# Patient Record
Sex: FEMALE | Race: BLACK OR AFRICAN AMERICAN | NOT HISPANIC OR LATINO | Employment: UNEMPLOYED | URBAN - METROPOLITAN AREA
[De-identification: names, ages, dates, MRNs, and addresses within clinical notes are randomized per-mention and may not be internally consistent; named-entity substitution may affect disease eponyms.]

---

## 2017-03-15 ENCOUNTER — HOSPITAL ENCOUNTER (EMERGENCY)
Facility: HOSPITAL | Age: 22
Discharge: HOME | End: 2017-03-15
Attending: EMERGENCY MEDICINE
Payer: MEDICAID

## 2017-03-15 VITALS
OXYGEN SATURATION: 99 % | TEMPERATURE: 98 F | WEIGHT: 205 LBS | RESPIRATION RATE: 20 BRPM | DIASTOLIC BLOOD PRESSURE: 79 MMHG | BODY MASS INDEX: 35 KG/M2 | HEIGHT: 64 IN | SYSTOLIC BLOOD PRESSURE: 148 MMHG | HEART RATE: 75 BPM

## 2017-03-15 DIAGNOSIS — R05.9 COUGH: Primary | ICD-10-CM

## 2017-03-15 PROCEDURE — 99283 EMERGENCY DEPT VISIT LOW MDM: CPT

## 2017-03-15 RX ORDER — BENZONATATE 100 MG/1
100 CAPSULE ORAL 3 TIMES DAILY PRN
Qty: 20 CAPSULE | Refills: 0 | Status: SHIPPED | OUTPATIENT
Start: 2017-03-15 | End: 2017-03-25

## 2017-03-15 NOTE — ED TRIAGE NOTES
Pt presents to ED with c/o HA and non-productive cough since the weekend. Pt states symptoms are worse during the night. Denies fever, chills or body aches.

## 2017-03-15 NOTE — ED AVS SNAPSHOT
[facility]  [place] [address]               Aziza Raul   3/15/2017  1:10 PM   ED    Description:  Female : 1995   Department:  [facility]           Your Care was Coordinated By:     Provider Role From To    Segun Whitlock MD Attending Provider 03/15/17 1310 --      Reason for Visit     Cough           Diagnoses this Visit        Comments    Cough    -  Primary       ED Disposition     None           To Do List           Follow-up Information     Follow up with Lore Rodriguez NP. Schedule an appointment as soon as possible for a visit in 3 days.    Specialty:  Family Medicine    Contact information:    [address] [place] [address]  [phone]          Follow up with [facility].    Specialty:  Emergency Medicine    Why:  If symptoms worsen    Contact information:    [place] [id number] [phone]       These Medications        Disp Refills Start End    benzonatate (TESSALON) 100 MG capsule 20 capsule 0 3/15/2017 3/25/2017    Take 1 capsule (100 mg total) by mouth 3 (three) times daily as needed for Cough. - Oral      [facility]     [facility] On [facility] Care Line -  Assistance  Registered nurses in the [facility] provide clinical advisement, health education, appointment booking, and other advisory services.  Call for this free service at [phone].             Medications           Message regarding Medications     Verify the changes and/or additions to your medication regime listed below are the same as discussed with your clinician today.  If any of these changes or additions are incorrect, please notify your healthcare provider.        START taking these NEW medications        Refills    benzonatate (TESSALON) 100 MG capsule 0    Sig: Take 1 capsule (100 mg total) by mouth 3 (three) times daily as needed for Cough.    Class: Print     "Route: Oral           Verify that the below list of medications is an accurate representation of the medications you are currently taking.  If none reported, the list may be blank. If incorrect, please contact your healthcare provider. Carry this list with you in case of emergency.           Current Medications     benzonatate (TESSALON) 100 MG capsule Take 1 capsule (100 mg total) by mouth 3 (three) times daily as needed for Cough.    promethazine-codeine 6.25-10 mg/5 ml (PHENERGAN WITH CODEINE) 6.25-10 mg/5 mL syrup Take 5 mLs by mouth every 6 (six) hours as needed for Cough.           Clinical Reference Information           Your Vitals Were     BP Pulse Temp Resp Height Weight    148/79 (BP Location: Right arm) 75 98.1 °F (36.7 °C) (Oral) 20 5' 4" (1.626 m) 93 kg (205 lb)    SpO2 BMI             99% 35.19 kg/m2         Allergies as of 3/15/2017     No Known Allergies      Immunizations Administered on Date of Encounter - 3/15/2017     None      ED Micro, Lab, POCT     Start Ordered       Status Ordering Provider    03/15/17 1221 03/15/17 1220  POCT urine pregnancy  Once      Ordered       ED Imaging Orders     None      Discharge References/Attachments     BRONCHITIS, ACUTE (ENGLISH)    BENZONATATE CAPSULES (ENGLISH)      [facility] Sign-Up     Activating your [facility] account is as easy as 1-2-3!     1) Visit [email or web address], select Sign Up Now, enter this activation code and your date of birth, then select Next.  [email or web address]  Expires: 4/29/2017  1:13 PM      2) Create a username and password to use when you visit [facility] in the future and select a security question in case you lose your password and select Next.    3) Enter your e-mail address and click Sign Up!    Additional Information  If you have questions, please e-mail [email or web address] or call [phone] to talk to our [facility] staff. Remember, [facility] is NOT to be used for urgent needs. For medical emergencies, dial 911.          " [facility] complies with applicable Federal civil rights laws and does not discriminate on the basis of race, color, national origin, age, disability, or sex.        Language Assistance Services     ATTENTION: Language assistance services are available, free of charge. Please call [phone].      ATENCIÓN: Si habla español, tiene a bingham disposición servicios gratuitos de asistencia lingüística. Llame al [phone].     CHÚ Ý: N?u b?n nói Ti?ng Vi?t, có các d?ch v? h? tr? ngôn ng? mi?n phí dành cho b?n. G?i s? [phone].

## 2017-03-15 NOTE — ED PROVIDER NOTES
Encounter Date: 3/15/2017       History     Chief Complaint   Patient presents with    Cough     dry cough x 4 days with headache; deneis nausea or vomiting; subjective fever     Review of patient's allergies indicates:  No Known Allergies  HPI Comments: Patient reports 4 days of cough and headache.  Gets chest pain and tightness when she is coughing.  No history of asthma.  Recently had a baby.  Denies any fevers.  Does have some muscle aches.  No sore throat.  No nausea vomiting.  Denies any sick contacts.  Uncertain if she had a flu vaccine.  Denies other complaints.    History reviewed. No pertinent past medical history.  History reviewed. No pertinent surgical history.  Family History   Problem Relation Age of Onset    Hypertension Maternal Grandfather     No Known Problems Mother     No Known Problems Father      Social History   Substance Use Topics    Smoking status: Never Smoker    Smokeless tobacco: Never Used    Alcohol use 0.0 oz/week      Comment: occasionally     Review of Systems   Constitutional: Positive for fatigue. Negative for activity change, appetite change, chills and fever.   HENT: Negative for congestion, drooling, ear pain, postnasal drip, rhinorrhea, sinus pressure, sneezing, sore throat, trouble swallowing and voice change.    Eyes: Negative for discharge and redness.   Respiratory: Positive for cough and chest tightness. Negative for shortness of breath and wheezing.    Cardiovascular: Negative for chest pain.   Gastrointestinal: Negative for abdominal pain, diarrhea, nausea and rectal pain.   Endocrine: Negative for polydipsia, polyphagia and polyuria.   Genitourinary: Negative for decreased urine volume, dysuria, flank pain, pelvic pain and urgency.   Musculoskeletal: Positive for myalgias. Negative for back pain and neck pain.   Skin: Negative for rash.   Neurological: Negative for dizziness, tremors, syncope, weakness and numbness.   Hematological: Does not bruise/bleed  easily.   Psychiatric/Behavioral: Negative for confusion and dysphoric mood.   All other systems reviewed and are negative.      Physical Exam   Initial Vitals   BP Pulse Resp Temp SpO2   03/15/17 1218 03/15/17 1218 03/15/17 1218 03/15/17 1218 03/15/17 1218   148/79 75 20 98.1 °F (36.7 °C) 99 %     Physical Exam    Nursing note and vitals reviewed.  Constitutional: She appears well-developed and well-nourished. She is not diaphoretic. No distress.   HENT:   Head: Normocephalic and atraumatic.   Right Ear: External ear normal.   Left Ear: External ear normal.   Nose: Nose normal.   Mouth/Throat: Oropharynx is clear and moist.   Eyes: Conjunctivae and EOM are normal. Pupils are equal, round, and reactive to light. Right eye exhibits no discharge. Left eye exhibits no discharge. No scleral icterus.   Neck: Normal range of motion. Neck supple.   Cardiovascular: Normal rate, regular rhythm, normal heart sounds and intact distal pulses.   No murmur heard.  Pulmonary/Chest: Breath sounds normal. No respiratory distress. She has no wheezes. She has no rhonchi. She has no rales. She exhibits no tenderness.   Abdominal: Soft. Bowel sounds are normal. She exhibits no distension. There is no tenderness. There is no rebound and no guarding.   Musculoskeletal: Normal range of motion. She exhibits no edema or tenderness.   Neurological: She is alert and oriented to person, place, and time. She has normal strength.   Skin: Skin is warm and dry.   Psychiatric: She has a normal mood and affect. Her behavior is normal. Judgment and thought content normal.         ED Course   Procedures  Labs Reviewed   POCT URINE PREGNANCY             Medical Decision Making:   ED Management:  Well-appearing patient presents complaining of cough and viral type syndromes.  Afebrile.  Vital signs within normal limits excepting for slight hypertension.  Lungs are clear.  I have considered PE, she has no complaint of shortness of breath, she is not  tachycardic, and her oxygenation is 99%.  Ear nose and throat without concerning findings.  No lymphadenopathy.  No indication for further testing or imaging.  Prescribed Tessalon Perles, counseled to symptomatic treatment, urged follow-up closely primary care.    I did have an extensive talk regarding signs to return for and need for follow up. Patient expressed understanding and will monitor symptoms closely and follow-up as needed.    PHOEBE Whitlock M.D.  03/15/2017  1:21 PM                     ED Course     Clinical Impression:   The encounter diagnosis was Cough.          Segun Whitlock MD  03/15/17 0368

## 2017-08-02 ENCOUNTER — HOSPITAL ENCOUNTER (EMERGENCY)
Facility: HOSPITAL | Age: 22
Discharge: HOME | End: 2017-08-02
Attending: EMERGENCY MEDICINE
Payer: MEDICAID

## 2017-08-02 VITALS
HEIGHT: 64 IN | BODY MASS INDEX: 34.15 KG/M2 | DIASTOLIC BLOOD PRESSURE: 63 MMHG | SYSTOLIC BLOOD PRESSURE: 138 MMHG | HEART RATE: 91 BPM | WEIGHT: 200 LBS | OXYGEN SATURATION: 98 % | TEMPERATURE: 98 F | RESPIRATION RATE: 18 BRPM
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
## 2017-08-02 DIAGNOSIS — S39.012A BACK STRAIN, INITIAL ENCOUNTER: ICD-10-CM

## 2017-08-02 DIAGNOSIS — R51.9 ACUTE NONINTRACTABLE HEADACHE, UNSPECIFIED HEADACHE TYPE: ICD-10-CM

## 2017-08-02 LAB
B-HCG UR QL: NEGATIVE
CTP QC/QA: YES

## 2017-08-02 PROCEDURE — 99283 EMERGENCY DEPT VISIT LOW MDM: CPT | Mod: 25

## 2017-08-02 PROCEDURE — 25000003 PHARM REV CODE 250: Performed by: NURSE PRACTITIONER

## 2017-08-02 PROCEDURE — 81025 URINE PREGNANCY TEST: CPT | Performed by: NURSE PRACTITIONER

## 2017-08-02 RX ORDER — NAPROXEN 500 MG/1
500 TABLET ORAL 2 TIMES DAILY PRN
Qty: 10 TABLET | Refills: 0 | Status: SHIPPED | OUTPATIENT
Start: 2017-08-02 | End: 2017-08-07

## 2017-08-02 RX ORDER — NAPROXEN 500 MG/1
500 TABLET ORAL
Status: COMPLETED | OUTPATIENT
Start: 2017-08-02 | End: 2017-08-02

## 2017-08-02 RX ORDER — METHOCARBAMOL 500 MG/1
1000 TABLET, FILM COATED ORAL 3 TIMES DAILY PRN
Qty: 18 TABLET | Refills: 0 | Status: SHIPPED | OUTPATIENT
Start: 2017-08-02 | End: 2017-12-30

## 2017-08-02 RX ADMIN — NAPROXEN 500 MG: 500 TABLET ORAL at 02:08
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
## 2017-08-02 NOTE — DISCHARGE INSTRUCTIONS
Please return to the Emergency Department for any new or worsening symptoms including: worsening back pain, headache, fever, chest pain, shortness of breath, loss of consciousness, dizziness, weakness, or any other concerns.     Please follow up with your Primary Care Provider within in the week. If you do not have a Primary Care Provider, you may contact the one listed on your discharge paperwork or you may also call the [facility] Appointment Desk at [phone] to schedule an appointment with a Primary Care Provider.     Please take all medication as prescribed. You have been prescribed Robaxin for muscle pain. Please do not take this medication while working, drinking alcohol, swimming, or while driving/operating heavy machinery. This medication may cause drowsiness, impair judgment, and reduce physical capabilities.    You have been prescribed Naproxen for pain. This is an Non-Steroidal Anti-Inflammatory (NSAID) Medication. Please do not take any additional NSAIDs while you are taking this medication including (Advil, Aleve, Motrin, Ibuprofen, Mobic\meloxicam, Naprosyn, etc.). Please stop taking this medication if you experience: weakness, itching, yellow skin or eyes, joint pains, vomiting blood, blood or black stools, unusual weight gain, or swelling in your arms, legs, hands, or feet.

## 2017-12-30 ENCOUNTER — HOSPITAL ENCOUNTER (EMERGENCY)
Facility: HOSPITAL | Age: 22
Discharge: HOME | End: 2017-12-30
Attending: EMERGENCY MEDICINE
Payer: MEDICAID

## 2017-12-30 VITALS
WEIGHT: 190 LBS | HEART RATE: 108 BPM | SYSTOLIC BLOOD PRESSURE: 137 MMHG | BODY MASS INDEX: 31.65 KG/M2 | HEIGHT: 65 IN | DIASTOLIC BLOOD PRESSURE: 86 MMHG | TEMPERATURE: 100 F | OXYGEN SATURATION: 100 % | RESPIRATION RATE: 20 BRPM

## 2017-12-30 DIAGNOSIS — J06.9 VIRAL URI WITH COUGH: ICD-10-CM

## 2017-12-30 DIAGNOSIS — J10.1 INFLUENZA A: Primary | ICD-10-CM

## 2017-12-30 LAB
B-HCG UR QL: NEGATIVE
CTP QC/QA: YES
DEPRECATED S PYO AG THROAT QL EIA: NEGATIVE
FLUAV AG SPEC QL IA: POSITIVE
FLUBV AG SPEC QL IA: NEGATIVE
SPECIMEN SOURCE: ABNORMAL

## 2017-12-30 PROCEDURE — 25000003 PHARM REV CODE 250: Performed by: PHYSICIAN ASSISTANT

## 2017-12-30 PROCEDURE — 99284 EMERGENCY DEPT VISIT MOD MDM: CPT | Mod: 25

## 2017-12-30 PROCEDURE — 87400 INFLUENZA A/B EACH AG IA: CPT

## 2017-12-30 PROCEDURE — 87880 STREP A ASSAY W/OPTIC: CPT

## 2017-12-30 PROCEDURE — 87081 CULTURE SCREEN ONLY: CPT

## 2017-12-30 PROCEDURE — 81025 URINE PREGNANCY TEST: CPT | Performed by: EMERGENCY MEDICINE

## 2017-12-30 RX ORDER — FLUTICASONE PROPIONATE 50 MCG
1 SPRAY, SUSPENSION (ML) NASAL 2 TIMES DAILY PRN
Qty: 15 G | Refills: 0 | Status: SHIPPED | OUTPATIENT
Start: 2017-12-30 | End: 2020-12-14

## 2017-12-30 RX ORDER — ACETAMINOPHEN 500 MG
1000 TABLET ORAL
Status: COMPLETED | OUTPATIENT
Start: 2017-12-30 | End: 2017-12-30

## 2017-12-30 RX ORDER — IBUPROFEN 600 MG/1
600 TABLET ORAL EVERY 6 HOURS PRN
Qty: 20 TABLET | Refills: 0 | Status: SHIPPED | OUTPATIENT
Start: 2017-12-30 | End: 2018-06-21

## 2017-12-30 RX ORDER — CETIRIZINE HYDROCHLORIDE 10 MG/1
10 TABLET ORAL DAILY
Qty: 30 TABLET | Refills: 0 | Status: ON HOLD | OUTPATIENT
Start: 2017-12-30 | End: 2019-07-27 | Stop reason: HOSPADM

## 2017-12-30 RX ORDER — ACETAMINOPHEN 325 MG/1
325 TABLET ORAL EVERY 6 HOURS PRN
COMMUNITY
End: 2018-06-21

## 2017-12-30 RX ORDER — OSELTAMIVIR PHOSPHATE 75 MG/1
75 CAPSULE ORAL 2 TIMES DAILY
Qty: 10 CAPSULE | Refills: 0 | Status: SHIPPED | OUTPATIENT
Start: 2017-12-30 | End: 2018-01-04

## 2017-12-30 RX ORDER — GUAIFENESIN/DEXTROMETHORPHAN 100-10MG/5
10 SYRUP ORAL 4 TIMES DAILY PRN
Qty: 240 ML | Refills: 0 | Status: SHIPPED | OUTPATIENT
Start: 2017-12-30 | End: 2018-01-09

## 2017-12-30 RX ORDER — OSELTAMIVIR PHOSPHATE 75 MG/1
75 CAPSULE ORAL
Status: COMPLETED | OUTPATIENT
Start: 2017-12-30 | End: 2017-12-30

## 2017-12-30 RX ORDER — GUAIFENESIN/DEXTROMETHORPHAN 100-10MG/5
10 SYRUP ORAL ONCE
Status: COMPLETED | OUTPATIENT
Start: 2017-12-30 | End: 2017-12-30

## 2017-12-30 RX ORDER — CETIRIZINE HYDROCHLORIDE 10 MG/1
10 TABLET ORAL
Status: COMPLETED | OUTPATIENT
Start: 2017-12-30 | End: 2017-12-30

## 2017-12-30 RX ADMIN — ACETAMINOPHEN 1000 MG: 500 TABLET ORAL at 10:12

## 2017-12-30 RX ADMIN — CETIRIZINE HYDROCHLORIDE 10 MG: 10 TABLET, FILM COATED ORAL at 10:12

## 2017-12-30 RX ADMIN — OSELTAMIVIR PHOSPHATE 75 MG: 75 CAPSULE ORAL at 12:12

## 2017-12-30 RX ADMIN — GUAIFENESIN AND DEXTROMETHORPHAN 10 ML: 100; 10 SYRUP ORAL at 11:12

## 2017-12-30 NOTE — DISCHARGE INSTRUCTIONS
You have been evaluated in the emergency department and found to have influenza.    We will discharge to with Robitussin for cough, Zyrtec to take daily, Flonase for nasal congestion, and Tamiflu to help with the treatment of influenza.  Continue to take ibuprofen as needed for discomfort and to treat fever.  Drink lots of fluids.    Follow-up with your primary care physician early next week for reevaluation and further management of symptoms.    Return to this ED if fever persist despite treatment, if unable to tolerate by mouth intake, if any other problems occur.

## 2017-12-30 NOTE — ED PROVIDER NOTES
"Encounter Date: 12/30/2017       History     Chief Complaint   Patient presents with    Cough     "my baby got the flu, my fever was 102 yesterday, bad headache, and coughing, cough so much my head started hurting"    Fever     22-year-old female with no significant past medical history on file presents to ED with chief complaint sore throat, rhinorrhea/nasal congestion, cough productive of yellow/brown sputum, and fever, all since yesterday.  She denies hemoptysis. Patient states her 10-month-old child was recently diagnosed with strep and influenza.  She admits to taking Tylenol without relief.  She denies decreased by mouth intake.  She denies shortness of breath or chest pain.  No abdominal complaints.  No nausea or vomiting.  No urinary complaints.  No vaginal complaints.  Symptoms constant.  No alleviating or exacerbating factors.  No radiation of pain.          Review of patient's allergies indicates:  No Known Allergies  History reviewed. No pertinent past medical history.  History reviewed. No pertinent surgical history.  Family History   Problem Relation Age of Onset    Hypertension Maternal Grandfather     No Known Problems Mother     No Known Problems Father      Social History   Substance Use Topics    Smoking status: Never Smoker    Smokeless tobacco: Never Used    Alcohol use 0.0 oz/week      Comment: occasionally     Review of Systems   Constitutional: Positive for fever. Negative for chills.   HENT: Positive for congestion, rhinorrhea and sore throat. Negative for ear discharge, ear pain and trouble swallowing.    Eyes: Negative.    Respiratory: Positive for cough (productive). Negative for chest tightness, shortness of breath and wheezing.    Cardiovascular: Negative for chest pain.   Gastrointestinal: Negative for abdominal pain, nausea and vomiting.   Endocrine: Negative.    Genitourinary: Negative for dysuria.   Musculoskeletal: Negative for back pain, neck pain and neck stiffness. "   Skin: Negative for rash.   Neurological: Negative for weakness and headaches.   Hematological: Does not bruise/bleed easily.   Psychiatric/Behavioral: Negative.    All other systems reviewed and are negative.      Physical Exam     Initial Vitals [12/30/17 0908]   BP Pulse Resp Temp SpO2   137/86 108 20 100 °F (37.8 °C) 100 %      MAP       103         Physical Exam    Nursing note and vitals reviewed.  Constitutional: She appears well-developed and well-nourished. She is not diaphoretic. No distress.   HENT:   Head: Normocephalic and atraumatic.   Bilateral TMs and ear canals wnl. No mastoid ttp. Oropharynx with mild erythema without tonsillar swelling/exudate.  No uvular deviation.  No oropharyngeal edema.  Airway patent without stridor.  Neck supple.  Mild anterior cervical chain lymphadenopathy without significant swelling.  Boggy nasal mucosa.   Eyes: Conjunctivae and EOM are normal. Pupils are equal, round, and reactive to light.   Neck: Normal range of motion. Neck supple. No tracheal deviation present.   Cardiovascular: Normal heart sounds and intact distal pulses.   No murmur heard.  Pulmonary/Chest: Breath sounds normal. No stridor. No respiratory distress. She has no wheezes. She has no rhonchi. She exhibits no tenderness.   Abdominal: Soft. Bowel sounds are normal. She exhibits no distension. There is no tenderness.   Musculoskeletal: Normal range of motion. She exhibits no tenderness.   Lymphadenopathy:     She has no cervical adenopathy.   Neurological: She is alert and oriented to person, place, and time.   Skin: Skin is warm and dry. Capillary refill takes less than 2 seconds.   Psychiatric: She has a normal mood and affect. Her behavior is normal. Judgment and thought content normal.         ED Course   Procedures  Labs Reviewed   INFLUENZA A AND B ANTIGEN - Abnormal; Notable for the following:        Result Value    Influenza A Ag, EIA Positive (*)     All other components within normal limits    THROAT SCREEN, RAPID   CULTURE, STREP A,  THROAT   POCT URINE PREGNANCY             Medical Decision Making:   Initial Assessment:   22-year-old female chief complaint nasal congestion/rhinorrhea, cough, fever, sore throat, all since yesterday.  + Sick contacts.  Differential Diagnosis:   URI, influenza, pharyngitis, tonsillitis, uvulitis, pneumonia, bronchitis, reactive airway disease  ED Management:  Patient overall well-appearing, in no acute distress, temp 100°F, she is tachycardic but normotensive.    Patient presents to ED complaining of nasal congestion/rhinorrhea, reactive cough, fever and sore throat since yesterday.  She does state that her daughter was recently diagnosed with both strep and influenza.  She denies shortness of breath or chest pain.  She denies difficulty with respirations.  No change in appetite or by mouth intake.  She is overall well-appearing and nontoxic.  She is speaking full sentences without issue.  Tympanic membranes and ear canals within normal limits.  Her neck is supple.  Boggy nasal mucosa.  Posterior oropharynx with mild erythema, however no obvious abnormality.  No stridor, airway patent.  Lungs are clear bilaterally.  She is not tachypneic, no rib retractions or nasal flaring, no signs of respiratory distress.  I do think this represents a viral URI, however given patient's recent sick contacts we will flu swab and rapid strep.  Patient is not immunocompromised, she is not on steroid therapy.    Flu swab positive for flu a.  Rapid strep negative.  I will discharge with Tamiflu as patient is within the 48-hour window.  We will also discharge with symptomatic medications.  I've asked patient to continue drinking lots of fluids, ibuprofen or Tylenol as needed for fever.  I do feel she is safe and stable for discharge and management as an outpatient.  She is no respiratory distress. I've asked her to return to this ED if any other problems occur.  Other:   I have discussed  this case with another health care provider.       <> Summary of the Discussion: I have discussed this case with Dr. Soliman.              Attending Attestation:     Physician Attestation Statement for NP/PA:   I discussed this assessment and plan of this patient with the NP/PA, but I did not personally examine the patient. The face to face encounter was performed by the NP/PA.                  ED Course      Clinical Impression:   The primary encounter diagnosis was Influenza A. A diagnosis of Viral URI with cough was also pertinent to this visit.    Disposition:   Disposition: Discharged  Condition: Stable                        Surendra Terrell PA-C  12/30/17 1220       Hai Soliman MD  01/02/18 0870

## 2017-12-30 NOTE — ED TRIAGE NOTES
Cough fever and headache child sick at home brown mucus since yesterday chest hurts from coughing sore throat

## 2018-01-01 LAB — BACTERIA THROAT CULT: NORMAL

## 2018-06-21 ENCOUNTER — HOSPITAL ENCOUNTER (EMERGENCY)
Facility: HOSPITAL | Age: 23
Discharge: HOME | End: 2018-06-21
Attending: EMERGENCY MEDICINE
Payer: MEDICAID

## 2018-06-21 VITALS
OXYGEN SATURATION: 99 % | DIASTOLIC BLOOD PRESSURE: 73 MMHG | HEIGHT: 64 IN | WEIGHT: 185 LBS | TEMPERATURE: 98 F | HEART RATE: 91 BPM | RESPIRATION RATE: 18 BRPM | BODY MASS INDEX: 31.58 KG/M2 | SYSTOLIC BLOOD PRESSURE: 123 MMHG

## 2018-06-21 DIAGNOSIS — R52 PAIN: ICD-10-CM

## 2018-06-21 DIAGNOSIS — R10.84 GENERALIZED ABDOMINAL PAIN: Primary | ICD-10-CM

## 2018-06-21 DIAGNOSIS — R07.9 CHEST PAIN: ICD-10-CM

## 2018-06-21 LAB
B-HCG UR QL: NEGATIVE
CTP QC/QA: YES

## 2018-06-21 PROCEDURE — 93010 ELECTROCARDIOGRAM REPORT: CPT | Mod: ,,, | Performed by: INTERNAL MEDICINE

## 2018-06-21 PROCEDURE — 99284 EMERGENCY DEPT VISIT MOD MDM: CPT | Mod: 25

## 2018-06-21 PROCEDURE — 93005 ELECTROCARDIOGRAM TRACING: CPT

## 2018-06-21 PROCEDURE — 81025 URINE PREGNANCY TEST: CPT | Performed by: PHYSICIAN ASSISTANT

## 2018-06-21 RX ORDER — FAMOTIDINE 20 MG/1
20 TABLET, FILM COATED ORAL
Qty: 60 TABLET | Refills: 0 | Status: SHIPPED | OUTPATIENT
Start: 2018-06-21 | End: 2020-12-14

## 2018-06-21 NOTE — ED TRIAGE NOTES
chest pain for 2 weeks and abdominal pain for a week nauseated yesterday achy crampy pain no cough sharp pain sometimes when takes a deep breath

## 2018-06-21 NOTE — ED PROVIDER NOTES
Encounter Date: 6/21/2018       History     Chief Complaint   Patient presents with    Chest Pain     x 2 weeks    Abdominal Pain     x 1 week    Nausea     HPI  Review of patient's allergies indicates:  No Known Allergies  History reviewed. No pertinent past medical history.  History reviewed. No pertinent surgical history.  Family History   Problem Relation Age of Onset    Hypertension Maternal Grandfather     No Known Problems Mother     No Known Problems Father      Social History   Substance Use Topics    Smoking status: Current Some Day Smoker    Smokeless tobacco: Never Used    Alcohol use 0.0 oz/week      Comment: occasionally     Review of Systems    Physical Exam     Initial Vitals [06/21/18 0838]   BP Pulse Resp Temp SpO2   130/60 85 20 98.3 °F (36.8 °C) 100 %      MAP       --         Physical Exam    ED Course   Procedures  Labs Reviewed   POCT URINE PREGNANCY          Imaging Results          X-Ray Chest PA And Lateral (Final result)  Result time 06/21/18 09:37:59    Final result by Karlo Yepez MD (06/21/18 09:37:59)                 Impression:      No acute chest disease identified.      Electronically signed by: Karlo Yepez MD  Date:    06/21/2018  Time:    09:37             Narrative:    EXAMINATION:  XR CHEST PA AND LATERAL    CLINICAL HISTORY:  Chest pain, unspecified    TECHNIQUE:  PA and lateral views of the chest were performed.    COMPARISON:  06/14/2016.    FINDINGS:  The cardiac silhouette and superior mediastinal structures are unremarkable. Pulmonary vasculature is within normal limits. The lungs are well aerated and free of focal consolidations. There is no evidence for pneumothorax or pleural effusions. Bony structures are grossly intact.                                              Attending Attestation:     Physician Attestation Statement for NP/PA:   I discussed this assessment and plan of this patient with the NP/PA, but I did not personally examine the patient. The  face to face encounter was performed by the NP/PA.                     Clinical Impression:     Abdominal pain, chest pain                           Corey Reid MD  06/21/18 6037

## 2018-06-21 NOTE — ED PROVIDER NOTES
Encounter Date: 6/21/2018       History     Chief Complaint   Patient presents with    Chest Pain     x 2 weeks    Abdominal Pain     x 1 week    Nausea     CC: Chest pain, Abdominal Pain, Nausea    HPI: 23 y.o. Female with PMHx non-cardiac chest pain presents for emergent consideration of chest pain, abdominal pain and nausea.  She reports intermittent left-sided chest pain that she describes as sharp.  She denies any alleviating or eliciting factors.  She also reports generalized abdominal pain that is intermittent cramping with associated nausea. She denies any current symptoms. She reports LMP in March 2018; she states that she was on DepoProvera (last injection received in January) and since that time she has only had 1 period. She denies vaginal bleeding, vaginal discharge or urinary complaints.           Review of patient's allergies indicates:  No Known Allergies  History reviewed. No pertinent past medical history.  History reviewed. No pertinent surgical history.  Family History   Problem Relation Age of Onset    Hypertension Maternal Grandfather     No Known Problems Mother     No Known Problems Father      Social History   Substance Use Topics    Smoking status: Current Some Day Smoker    Smokeless tobacco: Never Used    Alcohol use 0.0 oz/week      Comment: occasionally     Review of Systems   Constitutional: Negative for chills, diaphoresis, fatigue and fever.   HENT: Negative for congestion, ear pain and sore throat.    Eyes: Negative for pain.   Respiratory: Negative for shortness of breath.    Cardiovascular: Positive for chest pain.   Gastrointestinal: Positive for abdominal pain and nausea. Negative for blood in stool, constipation, diarrhea and vomiting.   Genitourinary: Negative for decreased urine volume, dysuria, vaginal bleeding and vaginal discharge.   Musculoskeletal: Negative for back pain and neck pain.   Skin: Negative for rash.   Neurological: Negative for weakness and  headaches.   Hematological: Does not bruise/bleed easily.   Psychiatric/Behavioral: Negative for confusion.       Physical Exam     Initial Vitals [06/21/18 0838]   BP Pulse Resp Temp SpO2   130/60 85 20 98.3 °F (36.8 °C) 100 %      MAP       --         Physical Exam    Nursing note and vitals reviewed.  Constitutional: Vital signs are normal. She appears well-developed and well-nourished. She is not diaphoretic. She is cooperative.  Non-toxic appearance. She does not have a sickly appearance. She does not appear ill. No distress.   HENT:   Head: Normocephalic and atraumatic.   Right Ear: Tympanic membrane, external ear and ear canal normal.   Left Ear: Tympanic membrane, external ear and ear canal normal.   Nose: Nose normal.   Mouth/Throat: Uvula is midline, oropharynx is clear and moist and mucous membranes are normal. No oropharyngeal exudate.   Eyes: Conjunctivae, EOM and lids are normal. Pupils are equal, round, and reactive to light.   Neck: Trachea normal, normal range of motion, full passive range of motion without pain and phonation normal. Neck supple.   Cardiovascular: Normal rate, regular rhythm, normal heart sounds and intact distal pulses. Exam reveals no gallop and no friction rub.    No murmur heard.  Pulmonary/Chest: Effort normal and breath sounds normal. No respiratory distress. She has no decreased breath sounds. She has no wheezes. She has no rhonchi. She has no rales. She exhibits no mass, no tenderness, no bony tenderness, no laceration, no crepitus, no edema, no deformity, no swelling and no retraction.   Abdominal: Soft. Normal appearance and bowel sounds are normal. She exhibits no distension and no mass. There is no tenderness. There is no rigidity, no rebound, no guarding and no CVA tenderness.   Musculoskeletal: Normal range of motion.   Neurological: She is alert and oriented to person, place, and time. She has normal strength.   Skin: Skin is warm and dry. Capillary refill takes less  than 2 seconds. No rash noted.   Psychiatric: She has a normal mood and affect. Her speech is normal and behavior is normal. Judgment and thought content normal. Cognition and memory are normal.         ED Course   Procedures  Labs Reviewed   POCT URINE PREGNANCY     EKG Readings: (Independently Interpreted)   Initial Reading: No STEMI. Rhythm: Normal Sinus Rhythm. Ectopy: No Ectopy. Conduction: Normal. ST Segments: Normal ST Segments. T Waves: Normal.       Imaging Results          X-Ray Chest PA And Lateral (Final result)  Result time 06/21/18 09:37:59    Final result by Karlo Yepez MD (06/21/18 09:37:59)                 Impression:      No acute chest disease identified.      Electronically signed by: Karlo Yepez MD  Date:    06/21/2018  Time:    09:37             Narrative:    EXAMINATION:  XR CHEST PA AND LATERAL    CLINICAL HISTORY:  Chest pain, unspecified    TECHNIQUE:  PA and lateral views of the chest were performed.    COMPARISON:  06/14/2016.    FINDINGS:  The cardiac silhouette and superior mediastinal structures are unremarkable. Pulmonary vasculature is within normal limits. The lungs are well aerated and free of focal consolidations. There is no evidence for pneumothorax or pleural effusions. Bony structures are grossly intact.                                       APC / Resident Notes:   This is an evaluation of a 23 y.o. female with PMHx non-cardiac chest pain that presents to the Emergency Department for chest pain, abdominal pain and nausea.  She reports intermittent left-sided chest pain that she describes as sharp.  She denies any alleviating or eliciting factors.  She also reports generalized abdominal pain that is intermittent cramping with associated nausea. She denies any current symptoms. She reports LMP in March 2018; she states that she was on DepoProvera (last injection received in January) and since that time she has only had 1 period. She denies vaginal bleeding, vaginal  discharge or urinary complaints. Patient reports that she is under additional stress secondary to her child's father and family issues.    Physical Exam shows a non-toxic, afebrile, and well appearing female.  There is no tenderness to palpation of the chest wall.  No sign of injury. No rash. Regular rate and rhythm, no murmurs, gallops or rubs. Lungs clear to auscultation bilaterally, no wheezing, rales or rhonchi.  There is no evidence of respiratory distress. There is no tenderness to palpation of the abdomen.  The abdomen is not distended.  Bowel sounds are appreciated. No peritoneal signs.    Vital Signs Are Reassuring. If available, previous records reviewed.   RESULTS:   UPT negative.  CXR unrevealing for pneumonia, pneumothorax, pleural effusion, rib fracture.  EKG shows normal sinus rhythm, no STEMI or emergent arrhythmia.    My overall impression is chest pain and generalized abdominal pain.  DDx: chest pain, abdominal pain, stress, anxiety, other  I do not suspect emergent process at this time.    ED Course:  The patient declines any medications in the ER, secondary to her being asymptomatic.  I feel this patient is stable for discharge. D/C Meds:  Pepcid.  The diagnosis, treatment plan, instructions for follow-up and reevaluation with PCP, as well as ED return precautions were discussed and understanding was verbalized. All questions or concerns have been addressed. Patient was discharged home with an instructional sheet which gave not only information regarding the most likely diagnoses but also information regarding when to return to the emergency department for alarming symptoms and when to seek further care.      This case was discussed with Dr. Reid who is in agreement with my assessment and plan.     Meryl Gaming PA-C                   Clinical Impression:   The primary encounter diagnosis was Generalized abdominal pain. Diagnoses of Pain and Chest pain were also pertinent to this  visit.      Disposition:   Disposition: Discharged  Condition: Stable                        Meryl Holbrook PA-C  06/21/18 1113       Meryl Holbrook PA-C  06/21/18 120

## 2018-06-21 NOTE — DISCHARGE INSTRUCTIONS
Your chest x-ray and EKG were normal today.    Please take Pepcid twice daily before meals.    Please try to lower your stress.    Please make a follow-up appointment with primary care.    Return to the emergency room for any concerns.

## 2018-11-19 ENCOUNTER — HOSPITAL ENCOUNTER (EMERGENCY)
Facility: HOSPITAL | Age: 23
Discharge: HOME | End: 2018-11-19
Attending: EMERGENCY MEDICINE
Payer: MEDICAID

## 2018-11-19 VITALS
BODY MASS INDEX: 30.82 KG/M2 | WEIGHT: 185 LBS | TEMPERATURE: 99 F | OXYGEN SATURATION: 99 % | HEIGHT: 65 IN | HEART RATE: 92 BPM | RESPIRATION RATE: 18 BRPM | SYSTOLIC BLOOD PRESSURE: 124 MMHG | DIASTOLIC BLOOD PRESSURE: 62 MMHG

## 2018-11-19 DIAGNOSIS — R07.9 CHEST PAIN: ICD-10-CM

## 2018-11-19 DIAGNOSIS — R05.9 COUGH: ICD-10-CM

## 2018-11-19 DIAGNOSIS — J06.9 VIRAL URI: Primary | ICD-10-CM

## 2018-11-19 LAB
B-HCG UR QL: NEGATIVE
CTP QC/QA: YES

## 2018-11-19 PROCEDURE — 81025 URINE PREGNANCY TEST: CPT | Performed by: PHYSICIAN ASSISTANT

## 2018-11-19 PROCEDURE — 25000003 PHARM REV CODE 250: Performed by: PHYSICIAN ASSISTANT

## 2018-11-19 PROCEDURE — 93005 ELECTROCARDIOGRAM TRACING: CPT

## 2018-11-19 PROCEDURE — 99284 EMERGENCY DEPT VISIT MOD MDM: CPT | Mod: 25

## 2018-11-19 PROCEDURE — 93010 ELECTROCARDIOGRAM REPORT: CPT | Mod: ,,, | Performed by: INTERNAL MEDICINE

## 2018-11-19 RX ORDER — BENZONATATE 100 MG/1
100 CAPSULE ORAL
Status: COMPLETED | OUTPATIENT
Start: 2018-11-19 | End: 2018-11-19

## 2018-11-19 RX ORDER — BENZONATATE 100 MG/1
100 CAPSULE ORAL 3 TIMES DAILY PRN
Qty: 15 CAPSULE | Refills: 0 | Status: ON HOLD | OUTPATIENT
Start: 2018-11-19 | End: 2019-07-27 | Stop reason: HOSPADM

## 2018-11-19 RX ADMIN — BENZONATATE 100 MG: 100 CAPSULE ORAL at 07:11

## 2018-11-19 NOTE — DISCHARGE INSTRUCTIONS
Make sure you are getting rest and drinking lots of fluids.    You have been prescribed Tessalon Perles to take up to 3 times daily for cough.    You can treat your symptoms with DayQuil maximum strength, NyQuil, Cepacol and/or cough drops.  You can also take Emergen-C to help boost your immune system.    Follow-up with primary care.    If for some reason your symptoms worsen or for any new or concerning symptoms, please return to this emergency room.

## 2018-11-19 NOTE — ED TRIAGE NOTES
Patient came in with c/o CP since yesterday. Patient also c/o body aches and sinus pains. Patient states that she was coughing up green mucus but now it is dry cough. Patient is not taking any medication.

## 2018-11-19 NOTE — ED PROVIDER NOTES
"Encounter Date: 11/19/2018    SCRIBE #1 NOTE: I, Jazz Duke, am scribing for, and in the presence of,  Meryl Holbrook PA-C. I have scribed the following portions of the note - Other sections scribed: HPI, ROS.       History     Chief Complaint   Patient presents with    Chest Pain     x2 days, has not taken any medication; reports recent sinus issues; reports she started coughing a lot and "catching chest pains when I was at work"; also reports generalized body aches     CC: Chest Pain    HPI: This is a 23 y.o. F who has no PMHx who presents to the ED for emergent evaluation of acute left sided CP that began yesterday morning. Pt's CP is exacerbated by coughing and sneezing. Pt also reports acute nasal congestion, runny nose, cough, sneezing, sinus pressure, and generalized body aches. She notes frequent sinus infections. No OTC treatment attempted PTA. She has not gotten the flu shot. Pt denies fever, sore throat, abdominal pain, nausea, vomiting, or diarrhea.        The history is provided by the patient. No  was used.     Review of patient's allergies indicates:  No Known Allergies  History reviewed. No pertinent past medical history.  History reviewed. No pertinent surgical history.  Family History   Problem Relation Age of Onset    Hypertension Maternal Grandfather     No Known Problems Mother     No Known Problems Father      Social History     Tobacco Use    Smoking status: Current Some Day Smoker    Smokeless tobacco: Never Used   Substance Use Topics    Alcohol use: Yes     Alcohol/week: 0.0 oz     Comment: occasionally    Drug use: No     Review of Systems   Constitutional: Negative for chills and fever.   HENT: Positive for congestion, rhinorrhea, sinus pressure and sneezing. Negative for ear discharge, ear pain, postnasal drip, sinus pain, sore throat and trouble swallowing.    Eyes: Negative for pain.   Respiratory: Positive for cough. Negative for shortness of breath.  "   Cardiovascular: Positive for chest pain.   Gastrointestinal: Negative for abdominal pain, constipation, diarrhea, nausea and vomiting.   Genitourinary: Negative for decreased urine volume, dysuria, vaginal bleeding, vaginal discharge and vaginal pain.   Musculoskeletal: Positive for myalgias. Negative for back pain.   Skin: Negative for rash.   Neurological: Negative for headaches.   Psychiatric/Behavioral: Negative for confusion.       Physical Exam     Initial Vitals [11/19/18 0653]   BP Pulse Resp Temp SpO2   128/69 96 16 98.5 °F (36.9 °C) 99 %      MAP       --         Physical Exam    Nursing note and vitals reviewed.  Constitutional: Vital signs are normal. She appears well-developed and well-nourished. She is not diaphoretic. She is cooperative.  Non-toxic appearance. She does not have a sickly appearance. She does not appear ill. No distress.   HENT:   Head: Normocephalic and atraumatic.   Right Ear: Tympanic membrane, external ear and ear canal normal.   Left Ear: Tympanic membrane, external ear and ear canal normal.   Nose: Rhinorrhea present. No mucosal edema. Right sinus exhibits no maxillary sinus tenderness and no frontal sinus tenderness. Left sinus exhibits no maxillary sinus tenderness and no frontal sinus tenderness.   Mouth/Throat: Uvula is midline, oropharynx is clear and moist and mucous membranes are normal. No oral lesions. No trismus in the jaw. No uvula swelling. No oropharyngeal exudate, posterior oropharyngeal edema or posterior oropharyngeal erythema.   Eyes: Conjunctivae, EOM and lids are normal. Pupils are equal, round, and reactive to light.   Neck: Trachea normal, normal range of motion, full passive range of motion without pain and phonation normal. Neck supple.   Cardiovascular: Normal rate, regular rhythm, normal heart sounds and intact distal pulses. Exam reveals no gallop and no friction rub.    No murmur heard.  Pulmonary/Chest: Effort normal and breath sounds normal. No  respiratory distress. She has no decreased breath sounds. She has no wheezes. She has no rhonchi. She has no rales.   nonproductive cough witnessed.   Abdominal: Soft. Normal appearance and bowel sounds are normal. She exhibits no distension and no mass. There is no tenderness. There is no rigidity, no rebound, no guarding and no CVA tenderness.   Musculoskeletal: Normal range of motion.   Lymphadenopathy:     She has no cervical adenopathy.   Neurological: She is alert and oriented to person, place, and time. She has normal strength.   Skin: Skin is warm and dry. Capillary refill takes less than 2 seconds. No rash noted.   Psychiatric: She has a normal mood and affect. Her speech is normal and behavior is normal. Judgment and thought content normal. Cognition and memory are normal.         ED Course   Procedures  Labs Reviewed   POCT URINE PREGNANCY     EKG Readings: (Independently Interpreted)   Initial Reading: No STEMI. Rhythm: Normal Sinus Rhythm. Ectopy: No Ectopy. Conduction: Normal. ST Segments: Normal ST Segments. T Waves: Normal. Clinical Impression: Normal Sinus Rhythm       Imaging Results          X-Ray Chest PA And Lateral (Final result)  Result time 11/19/18 07:49:04    Final result by Francisco eL MD (11/19/18 07:49:04)                 Impression:      No acute radiographic findings in the chest.      Electronically signed by: Francisco Le MD  Date:    11/19/2018  Time:    07:49             Narrative:    EXAMINATION:  XR CHEST PA AND LATERAL    CLINICAL HISTORY:  Cough    TECHNIQUE:  PA and lateral views of the chest were performed.    COMPARISON:  Radiograph 06/21/2018.    FINDINGS:  Mediastinal structures are midline.  Hilar contours are normal.  Cardiac silhouette is normal in size.  Lung volumes are symmetric.  No consolidation.  No pneumothorax or pleural effusions.  No free air beneath the diaphragm.  No acute osseous abnormalities.                                       APC / Resident  Notes:   This is an evaluation of a 23 y.o. female that presents to the Emergency Department for chest pain. Patient reports pleuritic chest pain when coughing. She reports that her cough was productive initially, but now it is non-productive. She also reports sinus congestion,  rhinorrhea and body aches. She denies fever, chills, sore throat, otalgia, shortness of breath, N/V/D/C, abdominal pain or further symptoms. She denies attempted tx PTA.     Exam findings: Patient is non-toxic, afebrile and well appearing.  Normocephalic and atraumatic. Nares are patent.  Clear rhinorrhea noted. No nasal mucosal edema. No sinus tenderness to palpation.  The posterior oropharynx is clear without erythema, edema, tonsillar exudates, petechiae. No cervical adenopathy.  TMs clear.  Lungs are clear to auscultation bilaterally, no wheezing, rales or rhonchi.  Nonproductive cough witnessed.  Regular rate and rhythm, no murmurs.  Abdomen is soft and nontender.  Bowel sounds appreciated. No peritoneal signs.  No rashes.  Remainder of exam is unremarkable.    If available, past records have been reviewed.  Vitals are reassuring.  Results:   UPT negative  EKG normal sinus rhythm  Chest x-ray unrevealing for pneumonia, pleural effusion, pneumothorax.    My overall impression:  Pleuritic chest pain, cough, viral URI  DDx:  Pleuritic chest pain, chest pain, pneumonia, pleural effusion, pneumothorax, cough, viral URI, other    ED course:  Tessalon Perle given in the emergency department.  I will prescribe Tessalon Perles and recommend symptomatic treatment for cold.  I will recommend close follow-up with primary care.  Work note given.  Patient is stable for discharge.  ED return precautions given.    The diagnosis and treatment plan have been discussed with the patient. All questions and concerns have been addressed. Patient expressed understanding. An educational information sheet was given to the patient prior to discharge.     This  case has been discussed with Dr. Rdoriguez and he is in agreement of the diagnosis and treatment plan.     Meryl Holbrook PA-C         Scribe Attestation:   Scribe #1: I performed the above scribed service and the documentation accurately describes the services I performed. I attest to the accuracy of the note.    Attending Attestation:           Physician Attestation for Scribe:  Physician Attestation Statement for Scribe #1: I, Meryl Holbrook PA-C, reviewed documentation, as scribed by Jazz Duke in my presence, and it is both accurate and complete.                    Clinical Impression:   The primary encounter diagnosis was Viral URI. Diagnoses of Chest pain and Cough were also pertinent to this visit.      Disposition:   Disposition: Discharged  Condition: Stable                        Meryl Holbrook PA-C  11/19/18 0816

## 2019-02-02 ENCOUNTER — HOSPITAL ENCOUNTER (EMERGENCY)
Facility: HOSPITAL | Age: 24
Discharge: HOME | End: 2019-02-02
Attending: EMERGENCY MEDICINE
Payer: MEDICAID

## 2019-02-02 VITALS
HEIGHT: 65 IN | WEIGHT: 185 LBS | RESPIRATION RATE: 18 BRPM | SYSTOLIC BLOOD PRESSURE: 121 MMHG | DIASTOLIC BLOOD PRESSURE: 60 MMHG | OXYGEN SATURATION: 100 % | BODY MASS INDEX: 30.82 KG/M2 | HEART RATE: 79 BPM | TEMPERATURE: 98 F

## 2019-02-02 DIAGNOSIS — R19.7 NAUSEA VOMITING AND DIARRHEA: ICD-10-CM

## 2019-02-02 DIAGNOSIS — R10.13 EPIGASTRIC PAIN: Primary | ICD-10-CM

## 2019-02-02 DIAGNOSIS — R11.2 NAUSEA VOMITING AND DIARRHEA: ICD-10-CM

## 2019-02-02 LAB
ALBUMIN SERPL BCP-MCNC: 3.8 G/DL
ALP SERPL-CCNC: 65 U/L
ALT SERPL W/O P-5'-P-CCNC: 13 U/L
ANION GAP SERPL CALC-SCNC: 8 MMOL/L
ANISOCYTOSIS BLD QL SMEAR: SLIGHT
AST SERPL-CCNC: 27 U/L
B-HCG UR QL: NEGATIVE
BACTERIA #/AREA URNS HPF: ABNORMAL /HPF
BASOPHILS # BLD AUTO: 0 K/UL
BASOPHILS NFR BLD: 0 %
BILIRUB SERPL-MCNC: 0.2 MG/DL
BILIRUB UR QL STRIP: NEGATIVE
BUN SERPL-MCNC: 9 MG/DL
CALCIUM SERPL-MCNC: 9.6 MG/DL
CHLORIDE SERPL-SCNC: 105 MMOL/L
CLARITY UR: ABNORMAL
CO2 SERPL-SCNC: 23 MMOL/L
COLOR UR: YELLOW
CREAT SERPL-MCNC: 0.8 MG/DL
CTP QC/QA: YES
DIFFERENTIAL METHOD: ABNORMAL
EOSINOPHIL # BLD AUTO: 0.1 K/UL
EOSINOPHIL NFR BLD: 2.4 %
ERYTHROCYTE [DISTWIDTH] IN BLOOD BY AUTOMATED COUNT: 18 %
EST. GFR  (AFRICAN AMERICAN): >60 ML/MIN/1.73 M^2
EST. GFR  (NON AFRICAN AMERICAN): >60 ML/MIN/1.73 M^2
GLUCOSE SERPL-MCNC: 106 MG/DL
GLUCOSE UR QL STRIP: NEGATIVE
HCT VFR BLD AUTO: 29.4 %
HGB BLD-MCNC: 8.4 G/DL
HGB UR QL STRIP: NEGATIVE
HYPOCHROMIA BLD QL SMEAR: ABNORMAL
KETONES UR QL STRIP: NEGATIVE
LEUKOCYTE ESTERASE UR QL STRIP: ABNORMAL
LIPASE SERPL-CCNC: 12 U/L
LYMPHOCYTES # BLD AUTO: 1.2 K/UL
LYMPHOCYTES NFR BLD: 24.7 %
MCH RBC QN AUTO: 20 PG
MCHC RBC AUTO-ENTMCNC: 28.6 G/DL
MCV RBC AUTO: 70 FL
MICROSCOPIC COMMENT: ABNORMAL
MONOCYTES # BLD AUTO: 0.3 K/UL
MONOCYTES NFR BLD: 6.8 %
NEUTROPHILS # BLD AUTO: 3.3 K/UL
NEUTROPHILS NFR BLD: 66.3 %
NITRITE UR QL STRIP: NEGATIVE
PH UR STRIP: 5 [PH] (ref 5–8)
PLATELET # BLD AUTO: 270 K/UL
PLATELET BLD QL SMEAR: ABNORMAL
PMV BLD AUTO: 10.6 FL
POTASSIUM SERPL-SCNC: 4 MMOL/L
PROT SERPL-MCNC: 7.7 G/DL
PROT UR QL STRIP: NEGATIVE
RBC # BLD AUTO: 4.19 M/UL
SODIUM SERPL-SCNC: 136 MMOL/L
SP GR UR STRIP: 1.03 (ref 1–1.03)
SQUAMOUS #/AREA URNS HPF: 15 /HPF
URN SPEC COLLECT METH UR: ABNORMAL
UROBILINOGEN UR STRIP-ACNC: NEGATIVE EU/DL
WBC # BLD AUTO: 4.97 K/UL
WBC #/AREA URNS HPF: 5 /HPF (ref 0–5)

## 2019-02-02 PROCEDURE — 96375 TX/PRO/DX INJ NEW DRUG ADDON: CPT

## 2019-02-02 PROCEDURE — 80053 COMPREHEN METABOLIC PANEL: CPT

## 2019-02-02 PROCEDURE — 83690 ASSAY OF LIPASE: CPT

## 2019-02-02 PROCEDURE — 96374 THER/PROPH/DIAG INJ IV PUSH: CPT

## 2019-02-02 PROCEDURE — 96361 HYDRATE IV INFUSION ADD-ON: CPT

## 2019-02-02 PROCEDURE — 81025 URINE PREGNANCY TEST: CPT | Performed by: PHYSICIAN ASSISTANT

## 2019-02-02 PROCEDURE — 99284 EMERGENCY DEPT VISIT MOD MDM: CPT | Mod: 25

## 2019-02-02 PROCEDURE — 85025 COMPLETE CBC W/AUTO DIFF WBC: CPT

## 2019-02-02 PROCEDURE — 25000003 PHARM REV CODE 250: Performed by: PHYSICIAN ASSISTANT

## 2019-02-02 PROCEDURE — 63600175 PHARM REV CODE 636 W HCPCS: Performed by: PHYSICIAN ASSISTANT

## 2019-02-02 PROCEDURE — 81000 URINALYSIS NONAUTO W/SCOPE: CPT

## 2019-02-02 RX ORDER — PANTOPRAZOLE SODIUM 20 MG/1
20 TABLET, DELAYED RELEASE ORAL DAILY
Qty: 30 TABLET | Refills: 0 | Status: SHIPPED | OUTPATIENT
Start: 2019-02-02 | End: 2019-03-04

## 2019-02-02 RX ORDER — KETOROLAC TROMETHAMINE 30 MG/ML
15 INJECTION, SOLUTION INTRAMUSCULAR; INTRAVENOUS
Status: COMPLETED | OUTPATIENT
Start: 2019-02-02 | End: 2019-02-02

## 2019-02-02 RX ORDER — ONDANSETRON 2 MG/ML
4 INJECTION INTRAMUSCULAR; INTRAVENOUS
Status: COMPLETED | OUTPATIENT
Start: 2019-02-02 | End: 2019-02-02

## 2019-02-02 RX ORDER — DICYCLOMINE HYDROCHLORIDE 20 MG/1
20 TABLET ORAL 4 TIMES DAILY PRN
Qty: 20 TABLET | Refills: 0 | Status: SHIPPED | OUTPATIENT
Start: 2019-02-02 | End: 2019-02-09

## 2019-02-02 RX ORDER — ONDANSETRON 4 MG/1
4 TABLET, ORALLY DISINTEGRATING ORAL EVERY 6 HOURS PRN
Qty: 15 TABLET | Refills: 0 | Status: SHIPPED | OUTPATIENT
Start: 2019-02-02 | End: 2019-02-07

## 2019-02-02 RX ADMIN — LIDOCAINE HYDROCHLORIDE: 20 SOLUTION ORAL; TOPICAL at 07:02

## 2019-02-02 RX ADMIN — KETOROLAC TROMETHAMINE 15 MG: 30 INJECTION, SOLUTION INTRAMUSCULAR at 08:02

## 2019-02-02 RX ADMIN — SODIUM CHLORIDE 1000 ML: 0.9 INJECTION, SOLUTION INTRAVENOUS at 07:02

## 2019-02-02 RX ADMIN — ONDANSETRON 4 MG: 2 INJECTION INTRAMUSCULAR; INTRAVENOUS at 08:02

## 2019-02-02 NOTE — ED TRIAGE NOTES
Reports having possible food poisoning. Reports eating crab cakes at work Thursday. Reports having abdominal pain, reports diarrhea, and vomitting

## 2019-02-02 NOTE — ED PROVIDER NOTES
Encounter Date: 2/2/2019    SCRIBE #1 NOTE: I, Cristiana Pena, am scribing for, and in the presence of,  Nicole Mcclelland PA-C. I have scribed the following portions of the note - Other sections scribed: HPI and ROS.       History     Chief Complaint   Patient presents with    Abdominal Pain     pt states Friday morning about 0100 she ate a crab cake. about 0500 she began to feel sick. later began having abd pain and vomiting . now having constant abd pain and can not hold food or fluids.     CC: Abdominal Pain    HPI: This 23 y.o female presents to the ED for an evaluation of acute onset, intermittent epigastric abdominal pain rated as 10/10 during onset x 2 days.  Reports pain initially began after eating a crab cake, but denies other eating similar foods reporting similar symptoms.  Patient reports at 2300 her pain became constant and reports it radiating to her bilateral thoracic back.  She reports of associated nausea with 4-5 episodes of emesis on yesterday along with 5-6 episodes of diarrhea.  She reports on yesterday, she noticed episodes of lightheadedness.  Patient denies fever, chills, cough, rhinorrhea, hematochezia, hematemesis, rash, dysuria, vaginal bleeding, vaginal discharge, chest pain, shortness of breath, or any other associated symptoms.  No prior tx.  No alleviating factors.  LMP 01/08/2019.  Last alcohol consumption 1 week ago.      The history is provided by the patient. No  was used.     Review of patient's allergies indicates:  No Known Allergies  History reviewed. No pertinent past medical history.  History reviewed. No pertinent surgical history.  Family History   Problem Relation Age of Onset    Hypertension Maternal Grandfather     No Known Problems Mother     No Known Problems Father      Social History     Tobacco Use    Smoking status: Current Some Day Smoker    Smokeless tobacco: Never Used   Substance Use Topics    Alcohol use: Yes     Alcohol/week: 0.0 oz      Comment: occasionally    Drug use: No     Review of Systems   Constitutional: Positive for chills. Negative for fever.   HENT: Negative for congestion, ear pain, rhinorrhea and sore throat.    Eyes: Negative for redness.   Respiratory: Negative for cough.    Gastrointestinal: Positive for abdominal pain, diarrhea, nausea and vomiting.   Genitourinary: Negative for dysuria, frequency, hematuria, urgency and vaginal discharge.   Musculoskeletal: Positive for back pain. Negative for neck pain.   Skin: Negative for rash.   Neurological: Negative for dizziness, speech difficulty and light-headedness.   Psychiatric/Behavioral: Negative for confusion.       Physical Exam     Initial Vitals [02/02/19 0649]   BP Pulse Resp Temp SpO2   (!) 149/66 94 16 98.3 °F (36.8 °C) 99 %      MAP       --         Physical Exam    Nursing note and vitals reviewed.  Constitutional: She appears well-developed and well-nourished.   HENT:   Head: Normocephalic.   Right Ear: External ear normal.   Left Ear: External ear normal.   Nose: Nose normal.   Mouth/Throat: Oropharynx is clear and moist.   Eyes: Conjunctivae are normal.   Cardiovascular: Normal rate and regular rhythm. Exam reveals no gallop and no friction rub.    No murmur heard.  Pulmonary/Chest: Breath sounds normal. She has no wheezes. She has no rhonchi. She has no rales.   Abdominal: Soft. She exhibits no distension. Bowel sounds are increased. There is tenderness in the right upper quadrant, epigastric area and left upper quadrant. There is no rigidity, no rebound, no guarding, no CVA tenderness, no tenderness at McBurney's point and negative Calhoun's sign.   Musculoskeletal: Normal range of motion.   Neurological: She is alert. She has normal strength.   Skin: Skin is warm and dry.   Psychiatric: She has a normal mood and affect.         ED Course   Procedures  Labs Reviewed   CBC W/ AUTO DIFFERENTIAL - Abnormal; Notable for the following components:       Result Value     Hemoglobin 8.4 (*)     Hematocrit 29.4 (*)     MCV 70 (*)     MCH 20.0 (*)     MCHC 28.6 (*)     RDW 18.0 (*)     All other components within normal limits   URINALYSIS, REFLEX TO URINE CULTURE - Abnormal; Notable for the following components:    Appearance, UA Hazy (*)     Leukocytes, UA 1+ (*)     All other components within normal limits    Narrative:     Preferred Collection Type->Urine, Clean Catch   URINALYSIS MICROSCOPIC - Abnormal; Notable for the following components:    Bacteria, UA Moderate (*)     All other components within normal limits    Narrative:     Preferred Collection Type->Urine, Clean Catch   COMPREHENSIVE METABOLIC PANEL   LIPASE   POCT URINE PREGNANCY          Imaging Results          US Abdomen Limited (Final result)  Result time 02/02/19 08:58:42    Final result by Treva Callahan MD (02/02/19 08:58:42)                 Impression:      Coarsened appearance of the liver with increased echogenicity suggesting fatty infiltration with intrinsic hepatic dysfunction.  Correlation with liver enzymes is recommended.    Questionable small amount of ascites adjacent to the hepatic dome.    No evidence for cholelithiasis or cholecystitis.      Electronically signed by: Treva Callahan MD  Date:    02/02/2019  Time:    08:58             Narrative:    EXAMINATION:  US ABDOMEN LIMITED    CLINICAL HISTORY:  Abdominal Pain - Gallbladder;    TECHNIQUE:  Limited ultrasound of the right upper quadrant of the abdomen (including pancreas, liver, gallbladder, common bile duct, and right kidney) was performed.    COMPARISON:  None.    FINDINGS:  Liver: Normal in size, measuring 13.7 cm. Coarsened appearance of the liver with increased echogenicity.  No focal hepatic lesions.    Gallbladder: No calculi, wall thickening, or pericholecystic fluid.  No sonographic Calhoun's sign.    Biliary system: The common duct is not dilated, measuring 2.7 mm.  No intrahepatic ductal dilatation.    Right kidney: Normal in size  with no hydronephrosis, measuring 10.8 cm.    Miscellaneous: Questionable small amount of fluid adjacent to the hepatic dome.                                 Medical Decision Making:   Initial Assessment:   23-year-old female with no pertinent past medical history and no abdominal surgeries presenting for evaluation of 2 day history of initially intermittent epigastric pain 10/10 no aggravating factors that became constant at approximately 2300 yesterday radiating to thoracic back bilaterally. associated vomiting 4-5 times since yesterday and diarrhea 5-6 times. Lightheadedness since yesterday. Denies melena, hematochezia, hematemesis. Last alcohol intake last week, she drinks one medium-large daquiri every month or every other month      Differentials cholecystitis, biliary colic, pancreatitis, choledocholithiasis bowel obstruction, acute abdomen  Upper abdominal tenderness to palpation with pain worst in the epigastrium and right upper quadrant.  No peritoneal signs. IV fluids, UPT negative. CBC with anemia. Vitals stable, doubt brisk bleed.  CMP with no electrolyte abnormality.  Lipase within normal limits. UA contaminated sample but doubt infection. Asymptomatic.   Toradol IV, Zofran IV and GI cocktail given.    Repeat exam 2120, pt reports feeling better. no abdominal pain, no longer feeling nauseous. Doubt acute abdomen.   Ultrasound with findings suggestive fatty infiltration with intrinsic hepatic dysfunction and questionable small amount of ascites to hepatic dome.  No evidence of cholelithiasis or cholecystitis.  LFTs within normal limits    Discussed findings with patient.  Discussed importance of GI follow-up for further evaluation management.  Bentyl, Protonix and Zofran at discharge. Follow up with primary care in 2 days as well.  Return to ER for worsening symptoms or as needed.  Discussed with Dr. Marroquin who agrees with assessment and plan.                   Scribe Attestation:   Scribe #1: I  performed the above scribed service and the documentation accurately describes the services I performed. I attest to the accuracy of the note.    Attending Attestation:           Physician Attestation for Scribe:  Physician Attestation Statement for Scribe #1: I, Nicole Mcclelland PA-C, reviewed documentation, as scribed by Cristiana Pena in my presence, and it is both accurate and complete.                    Clinical Impression:   The primary encounter diagnosis was Epigastric pain. A diagnosis of Nausea vomiting and diarrhea was also pertinent to this visit.                             Nicole Mcclelland PA-C  02/02/19 1011

## 2019-02-02 NOTE — DISCHARGE INSTRUCTIONS
Take Bentyl and Protonix for abdominal pain and Zofran for nausea.     Follow up with GI with a copy of your ultrasound results regarding abnormalities.     Return to ER for worsening symptoms, inability to tolerate by mouth or as needed.

## 2019-03-22 ENCOUNTER — HOSPITAL ENCOUNTER (EMERGENCY)
Facility: HOSPITAL | Age: 24
Discharge: HOME | End: 2019-03-22
Attending: EMERGENCY MEDICINE
Payer: MEDICAID

## 2019-03-22 VITALS
HEIGHT: 62 IN | RESPIRATION RATE: 20 BRPM | BODY MASS INDEX: 34.04 KG/M2 | SYSTOLIC BLOOD PRESSURE: 115 MMHG | WEIGHT: 185 LBS | DIASTOLIC BLOOD PRESSURE: 65 MMHG | OXYGEN SATURATION: 97 % | TEMPERATURE: 100 F | HEART RATE: 90 BPM

## 2019-03-22 DIAGNOSIS — R11.2 NON-INTRACTABLE VOMITING WITH NAUSEA, UNSPECIFIED VOMITING TYPE: Primary | ICD-10-CM

## 2019-03-22 LAB
ALBUMIN SERPL BCP-MCNC: 4.1 G/DL
ALP SERPL-CCNC: 62 U/L
ALT SERPL W/O P-5'-P-CCNC: 15 U/L
ANION GAP SERPL CALC-SCNC: 8 MMOL/L
ANISOCYTOSIS BLD QL SMEAR: SLIGHT
AST SERPL-CCNC: 21 U/L
B-HCG UR QL: NEGATIVE
BACTERIA #/AREA URNS HPF: NORMAL /HPF
BASOPHILS # BLD AUTO: 0.01 K/UL
BASOPHILS NFR BLD: 0.1 %
BILIRUB SERPL-MCNC: 0.2 MG/DL
BILIRUB UR QL STRIP: ABNORMAL
BUN SERPL-MCNC: 11 MG/DL
CALCIUM SERPL-MCNC: 9.9 MG/DL
CHLORIDE SERPL-SCNC: 106 MMOL/L
CLARITY UR: ABNORMAL
CO2 SERPL-SCNC: 22 MMOL/L
COLOR UR: YELLOW
CREAT SERPL-MCNC: 0.8 MG/DL
CTP QC/QA: YES
DIFFERENTIAL METHOD: ABNORMAL
EOSINOPHIL # BLD AUTO: 0.4 K/UL
EOSINOPHIL NFR BLD: 3.1 %
ERYTHROCYTE [DISTWIDTH] IN BLOOD BY AUTOMATED COUNT: 17.8 %
EST. GFR  (AFRICAN AMERICAN): >60 ML/MIN/1.73 M^2
EST. GFR  (NON AFRICAN AMERICAN): >60 ML/MIN/1.73 M^2
GIANT PLATELETS BLD QL SMEAR: PRESENT
GLUCOSE SERPL-MCNC: 93 MG/DL
GLUCOSE UR QL STRIP: NEGATIVE
HCT VFR BLD AUTO: 32.1 %
HGB BLD-MCNC: 9.6 G/DL
HGB UR QL STRIP: NEGATIVE
HYALINE CASTS #/AREA URNS LPF: 0 /LPF
HYPOCHROMIA BLD QL SMEAR: ABNORMAL
KETONES UR QL STRIP: ABNORMAL
LEUKOCYTE ESTERASE UR QL STRIP: ABNORMAL
LIPASE SERPL-CCNC: 13 U/L
LYMPHOCYTES # BLD AUTO: 1.4 K/UL
LYMPHOCYTES NFR BLD: 11.7 %
MCH RBC QN AUTO: 20.6 PG
MCHC RBC AUTO-ENTMCNC: 29.9 G/DL
MCV RBC AUTO: 69 FL
MICROSCOPIC COMMENT: NORMAL
MONOCYTES # BLD AUTO: 0.8 K/UL
MONOCYTES NFR BLD: 6.7 %
NEUTROPHILS # BLD AUTO: 9.2 K/UL
NEUTROPHILS NFR BLD: 78.6 %
NITRITE UR QL STRIP: NEGATIVE
OVALOCYTES BLD QL SMEAR: ABNORMAL
PH UR STRIP: 5 [PH] (ref 5–8)
PLATELET # BLD AUTO: 324 K/UL
PLATELET BLD QL SMEAR: ABNORMAL
PMV BLD AUTO: 10.1 FL
POLYCHROMASIA BLD QL SMEAR: ABNORMAL
POTASSIUM SERPL-SCNC: 3.9 MMOL/L
PROT SERPL-MCNC: 8.1 G/DL
PROT UR QL STRIP: ABNORMAL
RBC # BLD AUTO: 4.67 M/UL
RBC #/AREA URNS HPF: 1 /HPF (ref 0–4)
SODIUM SERPL-SCNC: 136 MMOL/L
SP GR UR STRIP: 1.03 (ref 1–1.03)
SQUAMOUS #/AREA URNS HPF: 4 /HPF
TARGETS BLD QL SMEAR: ABNORMAL
URN SPEC COLLECT METH UR: ABNORMAL
UROBILINOGEN UR STRIP-ACNC: ABNORMAL EU/DL
WBC # BLD AUTO: 11.75 K/UL
WBC #/AREA URNS HPF: 2 /HPF (ref 0–5)

## 2019-03-22 PROCEDURE — 80053 COMPREHEN METABOLIC PANEL: CPT

## 2019-03-22 PROCEDURE — 81000 URINALYSIS NONAUTO W/SCOPE: CPT

## 2019-03-22 PROCEDURE — 81025 URINE PREGNANCY TEST: CPT | Performed by: PHYSICIAN ASSISTANT

## 2019-03-22 PROCEDURE — 96372 THER/PROPH/DIAG INJ SC/IM: CPT | Mod: 59

## 2019-03-22 PROCEDURE — 85025 COMPLETE CBC W/AUTO DIFF WBC: CPT

## 2019-03-22 PROCEDURE — 96374 THER/PROPH/DIAG INJ IV PUSH: CPT

## 2019-03-22 PROCEDURE — 63600175 PHARM REV CODE 636 W HCPCS: Performed by: NURSE PRACTITIONER

## 2019-03-22 PROCEDURE — 83690 ASSAY OF LIPASE: CPT

## 2019-03-22 PROCEDURE — 25000003 PHARM REV CODE 250: Performed by: NURSE PRACTITIONER

## 2019-03-22 PROCEDURE — 96361 HYDRATE IV INFUSION ADD-ON: CPT

## 2019-03-22 PROCEDURE — 99284 EMERGENCY DEPT VISIT MOD MDM: CPT | Mod: 25

## 2019-03-22 RX ORDER — ONDANSETRON 2 MG/ML
4 INJECTION INTRAMUSCULAR; INTRAVENOUS
Status: COMPLETED | OUTPATIENT
Start: 2019-03-22 | End: 2019-03-22

## 2019-03-22 RX ORDER — DICYCLOMINE HYDROCHLORIDE 20 MG/1
20 TABLET ORAL 2 TIMES DAILY
Qty: 20 TABLET | Refills: 0 | Status: SHIPPED | OUTPATIENT
Start: 2019-03-22 | End: 2019-04-21

## 2019-03-22 RX ORDER — ONDANSETRON 4 MG/1
4 TABLET, FILM COATED ORAL EVERY 8 HOURS PRN
Qty: 12 TABLET | Refills: 0 | Status: SHIPPED | OUTPATIENT
Start: 2019-03-22

## 2019-03-22 RX ORDER — DICYCLOMINE HYDROCHLORIDE 10 MG/ML
20 INJECTION INTRAMUSCULAR
Status: COMPLETED | OUTPATIENT
Start: 2019-03-22 | End: 2019-03-22

## 2019-03-22 RX ADMIN — SODIUM CHLORIDE 1000 ML: 0.9 INJECTION, SOLUTION INTRAVENOUS at 08:03

## 2019-03-22 RX ADMIN — ONDANSETRON 4 MG: 2 INJECTION INTRAMUSCULAR; INTRAVENOUS at 08:03

## 2019-03-22 RX ADMIN — DICYCLOMINE HYDROCHLORIDE 20 MG: 20 INJECTION, SOLUTION INTRAMUSCULAR at 08:03

## 2019-03-23 NOTE — ED PROVIDER NOTES
"Encounter Date: 3/22/2019    SCRIBE #1 NOTE: I, Jazz Duke, am scribing for, and in the presence of,  Jackie Bejarano NP. I have scribed the following portions of the note - Other sections scribed: HPI, ROS.       History     Chief Complaint   Patient presents with    Emesis     emesis and abd pain since this morning, reports abd pain started yesterday and reports she is unable to hold down any food; reports she's felt "cold" but reports hx low Fe, does not take Fe pills     CC: GI Problem    HPI: This is a 23 y.o. F who has no PMHx who presents to the ED for emergent evaluation of acute epigastric abdominal pain that began today. Pt reports 3 episodes of vomiting immediately after eating a hotdog. No OTC treatment attempted PTA. Her last BM was today. Pt denies fever, diarrhea, hematemesis, vaginal bleeding, vaginal discharge, dysuria, recent travel, consumption of raw foods, or Hx of acid reflux.      The history is provided by the patient. No  was used.     Review of patient's allergies indicates:  No Known Allergies  History reviewed. No pertinent past medical history.  History reviewed. No pertinent surgical history.  Family History   Problem Relation Age of Onset    Hypertension Maternal Grandfather     No Known Problems Mother     Hypertension Father      Social History     Tobacco Use    Smoking status: Current Some Day Smoker    Smokeless tobacco: Never Used   Substance Use Topics    Alcohol use: Yes     Alcohol/week: 0.0 oz     Comment: occasionally    Drug use: No     Review of Systems   Constitutional: Negative for chills and fever.   HENT: Negative for ear pain and sore throat.    Eyes: Negative for pain.   Respiratory: Negative for cough and shortness of breath.    Cardiovascular: Negative for chest pain.   Gastrointestinal: Positive for abdominal pain, nausea and vomiting. Negative for blood in stool, constipation and diarrhea.        (-) Hematemesis   Genitourinary: " Negative for dysuria, vaginal bleeding and vaginal discharge.   Musculoskeletal: Negative for back pain.   Skin: Negative for rash.   Neurological: Negative for headaches.       Physical Exam     Initial Vitals [03/22/19 1936]   BP Pulse Resp Temp SpO2   (!) 141/103 (!) 115 17 98.9 °F (37.2 °C) 99 %      MAP       --         Physical Exam    Nursing note and vitals reviewed.  Constitutional: She appears well-developed and well-nourished.  Non-toxic appearance. No distress.   HENT:   Head: Normocephalic and atraumatic.   Right Ear: Hearing and external ear normal.   Left Ear: Hearing and external ear normal.   Nose: Nose normal.   Mouth/Throat: Uvula is midline, oropharynx is clear and moist and mucous membranes are normal.   Eyes: Conjunctivae and EOM are normal.   Neck: Full passive range of motion without pain. Neck supple.   Cardiovascular: Normal rate, normal heart sounds and normal pulses. Exam reveals no gallop and no friction rub.    No murmur heard.  Pulses:       Radial pulses are 2+ on the right side, and 2+ on the left side.   Pulmonary/Chest: Effort normal and breath sounds normal. No respiratory distress. She has no decreased breath sounds. She has no wheezes. She has no rhonchi. She has no rales.   Abdominal: Soft. Bowel sounds are normal. She exhibits no distension and no mass. There is no tenderness. There is no rigidity, no rebound, no guarding and no CVA tenderness. No hernia.   Musculoskeletal: Normal range of motion.   Neurological: She is alert and oriented to person, place, and time. She has normal strength. Gait normal.   Skin: Skin is warm, dry and intact. Capillary refill takes less than 2 seconds. No rash noted.   Psychiatric: She has a normal mood and affect. Her speech is normal and behavior is normal. Judgment and thought content normal. Cognition and memory are normal.         ED Course   Procedures  Labs Reviewed   URINALYSIS, REFLEX TO URINE CULTURE - Abnormal; Notable for the  following components:       Result Value    Appearance, UA Hazy (*)     Protein, UA 1+ (*)     Ketones, UA Trace (*)     Bilirubin (UA) 1+ (*)     Urobilinogen, UA 2.0-3.0 (*)     Leukocytes, UA 1+ (*)     All other components within normal limits    Narrative:     Preferred Collection Type->Urine, Clean Catch   URINALYSIS MICROSCOPIC    Narrative:     Preferred Collection Type->Urine, Clean Catch   CBC W/ AUTO DIFFERENTIAL   COMPREHENSIVE METABOLIC PANEL   LIPASE   POCT URINE PREGNANCY          Imaging Results    None          Medical Decision Making:   History:   Old Medical Records: I decided to obtain old medical records.  Clinical Tests:   Lab Tests: Ordered and Reviewed  ED Management:  Patient appears to have viral gastroenteritis.  There may also be a GERD or gastritis component.  The patient does not appear dehydrated, septic, toxic and I doubt this is bacterial in nature given that the patient has no bloody stools, recent antibiotics, foreign travel, or raw foods.  Symptoms improved following intravenous fluid resuscitation, Zofran, and Bentyl administration.  Successful p.o. challenge here in the ED.  I do not think this is appendicitis, cholecystitis, obstruction, or diverticulitis.  The patient appears very well, hydrated, and is stable for discharge. All questions answered.  Return precautions given.            Scribe Attestation:   Scribe #1: I performed the above scribed service and the documentation accurately describes the services I performed. I attest to the accuracy of the note.    Attending Attestation:           Physician Attestation for Scribe:  Physician Attestation Statement for Scribe #1: I, Jackie Bejarano NP, reviewed documentation, as scribed by Jazz Duke in my presence, and it is both accurate and complete.                    Clinical Impression:       ICD-10-CM ICD-9-CM   1. Non-intractable vomiting with nausea, unspecified vomiting type R11.2 787.01                                 Jackie Bejarano, NP  03/22/19 5986

## 2019-03-23 NOTE — DISCHARGE INSTRUCTIONS
Thank you for allowing me to care for you today.  I hope our treatment plan will make you feel better in the next few days.  In order for me to take better care of my future patients and improve our Emergency Department, I would appreciate if you can provide us with feedback.  In the next few days, you may receive a survey in the mail.  If you do, it would mean a great deal to me if you would please take the time to complete it.    Thank you and I hope you feel better.  Jackie Bejarano NP

## 2019-03-23 NOTE — ED TRIAGE NOTES
"Pt c/o upper abdominal pain x2 days, N/V since this AM. Vomiting x3 episodes today. Denies diarrhea, dysuria, fever. Also c/o gas, "I feel cold". Describes abdominal pain as "an achy pain", rated 7-8/10. Denies taking meds PTA  "

## 2019-05-25 ENCOUNTER — HOSPITAL ENCOUNTER (EMERGENCY)
Facility: HOSPITAL | Age: 24
Discharge: HOME | End: 2019-05-25
Attending: EMERGENCY MEDICINE
Payer: MEDICAID

## 2019-05-25 VITALS
TEMPERATURE: 98 F | HEIGHT: 64 IN | OXYGEN SATURATION: 100 % | RESPIRATION RATE: 18 BRPM | HEART RATE: 97 BPM | BODY MASS INDEX: 32.44 KG/M2 | WEIGHT: 190 LBS | DIASTOLIC BLOOD PRESSURE: 63 MMHG | SYSTOLIC BLOOD PRESSURE: 129 MMHG

## 2019-05-25 DIAGNOSIS — D50.9 MICROCYTIC HYPOCHROMIC ANEMIA: ICD-10-CM

## 2019-05-25 DIAGNOSIS — R10.9 ABDOMINAL PAIN AFFECTING PREGNANCY: Primary | ICD-10-CM

## 2019-05-25 DIAGNOSIS — O26.899 ABDOMINAL PAIN AFFECTING PREGNANCY: Primary | ICD-10-CM

## 2019-05-25 DIAGNOSIS — Z3A.01 PREGNANCY WITH 7 COMPLETED WEEKS GESTATION: ICD-10-CM

## 2019-05-25 LAB
ALBUMIN SERPL BCP-MCNC: 3.5 G/DL (ref 3.5–5.2)
ALP SERPL-CCNC: 66 U/L (ref 55–135)
ALT SERPL W/O P-5'-P-CCNC: 38 U/L (ref 10–44)
ANION GAP SERPL CALC-SCNC: 9 MMOL/L (ref 8–16)
ANISOCYTOSIS BLD QL SMEAR: ABNORMAL
AST SERPL-CCNC: 41 U/L (ref 10–40)
B-HCG UR QL: NEGATIVE
B-HCG UR QL: POSITIVE
BACTERIA GENITAL QL WET PREP: ABNORMAL
BASOPHILS # BLD AUTO: 0.01 K/UL (ref 0–0.2)
BASOPHILS NFR BLD: 0.2 % (ref 0–1.9)
BILIRUB SERPL-MCNC: 0.1 MG/DL (ref 0.1–1)
BILIRUB UR QL STRIP: NEGATIVE
BUN SERPL-MCNC: 12 MG/DL (ref 6–20)
CALCIUM SERPL-MCNC: 9.2 MG/DL (ref 8.7–10.5)
CHLORIDE SERPL-SCNC: 105 MMOL/L (ref 95–110)
CLARITY UR: CLEAR
CLUE CELLS VAG QL WET PREP: ABNORMAL
CO2 SERPL-SCNC: 20 MMOL/L (ref 23–29)
COLOR UR: YELLOW
CREAT SERPL-MCNC: 0.7 MG/DL (ref 0.5–1.4)
CTP QC/QA: YES
CTP QC/QA: YES
DIFFERENTIAL METHOD: ABNORMAL
EOSINOPHIL # BLD AUTO: 0.1 K/UL (ref 0–0.5)
EOSINOPHIL NFR BLD: 1.5 % (ref 0–8)
ERYTHROCYTE [DISTWIDTH] IN BLOOD BY AUTOMATED COUNT: 18.3 % (ref 11.5–14.5)
EST. GFR  (AFRICAN AMERICAN): >60 ML/MIN/1.73 M^2
EST. GFR  (NON AFRICAN AMERICAN): >60 ML/MIN/1.73 M^2
FILAMENT FUNGI VAG WET PREP-#/AREA: ABNORMAL
GLUCOSE SERPL-MCNC: 100 MG/DL (ref 70–110)
GLUCOSE UR QL STRIP: NEGATIVE
HCG INTACT+B SERPL-ACNC: NORMAL MIU/ML
HCT VFR BLD AUTO: 27.1 % (ref 37–48.5)
HGB BLD-MCNC: 7.9 G/DL (ref 12–16)
HGB UR QL STRIP: NEGATIVE
HYPOCHROMIA BLD QL SMEAR: ABNORMAL
KETONES UR QL STRIP: NEGATIVE
LEUKOCYTE ESTERASE UR QL STRIP: NEGATIVE
LIPASE SERPL-CCNC: 44 U/L (ref 4–60)
LYMPHOCYTES # BLD AUTO: 2 K/UL (ref 1–4.8)
LYMPHOCYTES NFR BLD: 30.7 % (ref 18–48)
MCH RBC QN AUTO: 19.7 PG (ref 27–31)
MCHC RBC AUTO-ENTMCNC: 29.2 G/DL (ref 32–36)
MCV RBC AUTO: 68 FL (ref 82–98)
MONOCYTES # BLD AUTO: 0.5 K/UL (ref 0.3–1)
MONOCYTES NFR BLD: 8.2 % (ref 4–15)
NEUTROPHILS # BLD AUTO: 3.9 K/UL (ref 1.8–7.7)
NEUTROPHILS NFR BLD: 59.6 % (ref 38–73)
NITRITE UR QL STRIP: NEGATIVE
PH UR STRIP: 6 [PH] (ref 5–8)
PLATELET # BLD AUTO: 215 K/UL (ref 150–350)
PLATELET BLD QL SMEAR: ABNORMAL
PMV BLD AUTO: 10.2 FL (ref 9.2–12.9)
POLYCHROMASIA BLD QL SMEAR: ABNORMAL
POTASSIUM SERPL-SCNC: 3.4 MMOL/L (ref 3.5–5.1)
PROT SERPL-MCNC: 7 G/DL (ref 6–8.4)
PROT UR QL STRIP: NEGATIVE
RBC # BLD AUTO: 4.01 M/UL (ref 4–5.4)
SODIUM SERPL-SCNC: 134 MMOL/L (ref 136–145)
SP GR UR STRIP: 1.02 (ref 1–1.03)
SPECIMEN SOURCE: ABNORMAL
T VAGINALIS GENITAL QL WET PREP: ABNORMAL
URN SPEC COLLECT METH UR: NORMAL
UROBILINOGEN UR STRIP-ACNC: NEGATIVE EU/DL
WBC # BLD AUTO: 6.62 K/UL (ref 3.9–12.7)
WBC #/AREA VAG WET PREP: ABNORMAL
YEAST GENITAL QL WET PREP: ABNORMAL

## 2019-05-25 PROCEDURE — 84702 CHORIONIC GONADOTROPIN TEST: CPT

## 2019-05-25 PROCEDURE — 99284 EMERGENCY DEPT VISIT MOD MDM: CPT

## 2019-05-25 PROCEDURE — 87491 CHLMYD TRACH DNA AMP PROBE: CPT

## 2019-05-25 PROCEDURE — 87086 URINE CULTURE/COLONY COUNT: CPT

## 2019-05-25 PROCEDURE — 83690 ASSAY OF LIPASE: CPT

## 2019-05-25 PROCEDURE — 85025 COMPLETE CBC W/AUTO DIFF WBC: CPT

## 2019-05-25 PROCEDURE — 80053 COMPREHEN METABOLIC PANEL: CPT

## 2019-05-25 PROCEDURE — 81025 URINE PREGNANCY TEST: CPT | Performed by: NURSE PRACTITIONER

## 2019-05-25 PROCEDURE — 81003 URINALYSIS AUTO W/O SCOPE: CPT

## 2019-05-25 PROCEDURE — 87210 SMEAR WET MOUNT SALINE/INK: CPT

## 2019-05-25 NOTE — ED TRIAGE NOTES
24 y.o female presents to the ED for evaluation of abdominal cramping. Patient reports she had her menstrual cycle last month but took a pregnancy test yesterday that was positive. She denies vaginal bleeding. AAOX4, ND.

## 2019-05-25 NOTE — ED PROVIDER NOTES
Encounter Date: 5/25/2019       History     Chief Complaint   Patient presents with    Female  Problem     Pt reports finding out she was pregnant yesterday. Reports abdominal cramping today. Denies bleeding. Last menstrual was the beginning of last month      24-year-old female presenting for evaluation of suprapubic abdominal pain that began today.  She reports recently finding out that she is pregnant.  She is uncertain of the exact date of for LMP but knows that it was an early April and lasted for 5 days.  She denies vaginal bleeding, vaginal discharge, nausea, vomiting, diarrhea, constipation, dysuria, urinary frequency, hematuria, back pain, or any additional symptoms. Denies concern for STDs.  Has not taken any medications or attempted any other treatments for symptoms.        Review of patient's allergies indicates:  No Known Allergies  History reviewed. No pertinent past medical history.  History reviewed. No pertinent surgical history.  Family History   Problem Relation Age of Onset    Hypertension Maternal Grandfather     No Known Problems Mother     Hypertension Father      Social History     Tobacco Use    Smoking status: Current Some Day Smoker    Smokeless tobacco: Never Used   Substance Use Topics    Alcohol use: Yes     Alcohol/week: 0.0 oz     Comment: occasionally    Drug use: Yes     Types: Marijuana     Review of Systems   Constitutional: Negative for chills, fatigue and fever.   HENT: Negative for congestion, ear pain, nosebleeds, postnasal drip, rhinorrhea, sinus pressure and sore throat.    Eyes: Negative for photophobia and pain.   Respiratory: Negative for apnea, cough, choking, chest tightness, shortness of breath, wheezing and stridor.    Cardiovascular: Negative for chest pain, palpitations and leg swelling.   Gastrointestinal: Positive for abdominal pain. Negative for constipation, diarrhea, nausea and vomiting.   Genitourinary: Negative for dysuria, flank pain, hematuria,  pelvic pain, vaginal bleeding and vaginal discharge.   Musculoskeletal: Negative for arthralgias, back pain, gait problem and neck pain.   Skin: Negative for color change, pallor, rash and wound.   Neurological: Negative for dizziness, seizures, syncope, facial asymmetry, light-headedness and headaches.   Hematological: Negative for adenopathy.   Psychiatric/Behavioral: Negative for confusion. The patient is not nervous/anxious.        Physical Exam     Initial Vitals [05/25/19 0407]   BP Pulse Resp Temp SpO2   (!) 129/59 103 16 99.1 °F (37.3 °C) 100 %      MAP       --         Physical Exam    Nursing note and vitals reviewed.  Constitutional: She appears well-developed and well-nourished. She is not diaphoretic. She is active and cooperative.  Non-toxic appearance. She does not have a sickly appearance. She does not appear ill. No distress.   Pleasant, cooperative, well-appearing, talkative, in no distress   HENT:   Head: Normocephalic and atraumatic.   Right Ear: External ear normal.   Left Ear: External ear normal.   Nose: Nose normal.   Eyes: Conjunctivae and EOM are normal. Right eye exhibits no discharge. Left eye exhibits no discharge.   Neck: Normal range of motion. Neck supple. No tracheal deviation present.   Pulmonary/Chest: No stridor. No respiratory distress.   Abdominal: Soft. Normal appearance. She exhibits no distension. There is tenderness in the suprapubic area. There is no rigidity, no rebound, no guarding, no CVA tenderness, no tenderness at McBurney's point and negative Calhoun's sign.   Mild suprapubic tenderness to palpation without rigidity or guarding. No rebound tenderness.   Genitourinary: Vagina normal. Pelvic exam was performed with patient supine. Uterus is not tender. Cervix exhibits no motion tenderness, no discharge and no friability. Right adnexum displays no mass, no tenderness and no fullness. Left adnexum displays no mass, no tenderness and no fullness.   Genitourinary Comments:  No abnormalities to the external genitalia.  No discharge or bleeding in the vaginal vault.  Cervical os is closed.  No cervical friability or discharge. No CMT.  No adnexal or uterine TTP.  Exam chaperoned by Meryl Dumont LPN.   Musculoskeletal: Normal range of motion. She exhibits no tenderness.   Neurological: She is alert and oriented to person, place, and time. She has normal strength. No cranial nerve deficit or sensory deficit.   Skin: Skin is warm and dry.   Psychiatric: She has a normal mood and affect. Her behavior is normal. Judgment and thought content normal.         ED Course   Procedures  Labs Reviewed   CBC W/ AUTO DIFFERENTIAL - Abnormal; Notable for the following components:       Result Value    Hemoglobin 7.9 (*)     Hematocrit 27.1 (*)     Mean Corpuscular Volume 68 (*)     Mean Corpuscular Hemoglobin 19.7 (*)     Mean Corpuscular Hemoglobin Conc 29.2 (*)     RDW 18.3 (*)     All other components within normal limits   COMPREHENSIVE METABOLIC PANEL - Abnormal; Notable for the following components:    Sodium 134 (*)     Potassium 3.4 (*)     CO2 20 (*)     AST 41 (*)     All other components within normal limits   VAGINAL SCREEN - Abnormal; Notable for the following components:    WBC - Vaginal Screen Occasional (*)     Bacteria - Vaginal Screen Rare (*)     All other components within normal limits   CULTURE, URINE    Narrative:     Indicated criteria for high risk culture:->Pregnant   C. TRACHOMATIS/N. GONORRHOEAE BY AMP DNA    Narrative:     Resulting Location->[facility]   URINALYSIS   LIPASE   HCG, QUANTITATIVE, PREGNANCY   POCT URINE PREGNANCY          Imaging Results          US OB Less Than 14 Wks First Gestation (Final result)  Result time 05/25/19 05:46:02   Procedure changed from US OB Less Than 14 Wks with Transvag(xpd     Final result by Jadiel Lu DO (05/25/19 05:46:02)                 Impression:      Single live intrauterine gestation with gestational measurements correspond  to approximately 73 day gestation as detailed above.    Otherwise unremarkable limited pelvic ultrasound as detailed above secondary to transabdominal imaging only with patient refusal for transvaginal imaging.      Electronically signed by: Jadiel Lu DO  Date:    2019  Time:    05:46             Narrative:    EXAMINATION:  US OB LESS THAN 14 WEEKS FIRST GESTATION    CLINICAL HISTORY:  suprapubic pain with positive UPT;    TECHNIQUE:  Transabdominal ultrasound of the pelvis.  Transvaginal imaging was not performed secondary to patient refusal.    COMPARISON:  None    FINDINGS:  The uterus is enlarged measuring 11.1 x 7.4 x 5.6 cm.    There is a single live intrauterine gestation with gestational sac fetal pole and yolk sac identified.  Fetal cardiac activity recorded 153 beats per minute.    Mean sac diameter 3.18 cm which corresponds to estimated gestational age of 7 weeks and 3 days with LUKAS of 2020.    The right ovary is normal in size measuring 3.2 x 2.8 x 2.0 cm.    The liver is normal in size measuring 3.7 x 3.8 x 3.3 cm.  There is a focal anechoic region within the left measuring 3.0 cm most compatible with corpus luteal cyst.    No evidence for adnexal mass or free fluid in the pelvis.  There is vascular flow identified in the ovaries bilaterally.                                 Medical Decision Making:   History:   Old Medical Records: I decided to obtain old medical records.  Differential Diagnosis:   Ectopic pregnancy, threatened , spontaneous , UTI, ovarian cyst, PID, TOA, others  Clinical Tests:   Lab Tests: Ordered and Reviewed  Radiological Study: Ordered and Reviewed  ED Management:  HPI and physical exam as above.    24-year-old female presenting for evaluation of lower abdominal and suprapubic pain. Reports recently discovering that she is pregnant.  Has not yet seen her OBGYN or had an ultrasound performed.  Physical exam remarkable for mild generalized suprapubic  tenderness.  Pelvic exam is unremarkable. No evidence of PID.  No vaginal bleeding.  HCG is 83,024. CBC with evidence of anemia, however patient denies lightheadedness, syncope, fatigue.  Urinalysis without evidence of infection.  Ultrasound shows a 7 week and 3 day IUP with a fetal heartbeat of 153 beats per minute. Rh testing was not performed due to absence of vaginal bleeding.  Patient is tolerating p.o. without difficulty and is comfortable and very well-appearing at this time.  I believe patient is stable for discharge and outpatient follow-up.    Advised patient to follow up with her OBGYN and PCP for re-evaluation and further management.  ED return precautions given. All questions regarding diagnosis and plan were answered to the patient's fullest possible satisfaction. Patient expressed understanding of diagnosis, discharge instructions, and return precautions.                        Clinical Impression:       ICD-10-CM ICD-9-CM   1. Abdominal pain affecting pregnancy O26.899 646.80    R10.9 789.00   2. Pregnancy with 7 completed weeks gestation Z3A.01 V22.2   3. Microcytic hypochromic anemia D50.9 280.9         Disposition:   Disposition: Discharged  Condition: Stable                        David Myrick NP  06/07/19 0722

## 2019-05-25 NOTE — DISCHARGE INSTRUCTIONS
Schedule a follow-up appointment with your OBGYN for further testing and treatment as discussed.  Begin taking prenatal vitamins daily if you are not already doing so.  Return to the emergency department for any new or worsening symptoms or as needed for any additional concerns.    Thank you for coming to our Emergency Department today. It is important to remember that some problems are difficult to diagnose and may not be found during your first visit. Be sure to follow up with your primary care doctor.  If you do not have one, you may contact the one listed on your discharge paperwork or you may also call the [facility] Appointment Desk at [phone] to schedule an appointment with one.     Return to the ER with any questions/concerns, new/concerning symptoms, worsening or failure to improve. Do not drive or make any important decisions for 24 hours if you have received any pain medications, sedatives or mood altering drugs during your ER visit.

## 2019-05-27 LAB
BACTERIA UR CULT: NORMAL
C TRACH DNA SPEC QL NAA+PROBE: NOT DETECTED
N GONORRHOEA DNA SPEC QL NAA+PROBE: NOT DETECTED

## 2019-07-25 ENCOUNTER — HOSPITAL ENCOUNTER (INPATIENT)
Facility: HOSPITAL | Age: 24
LOS: 2 days | Discharge: HOME | DRG: 872 | End: 2019-07-27
Attending: EMERGENCY MEDICINE | Admitting: INTERNAL MEDICINE
Payer: MEDICAID

## 2019-07-25 DIAGNOSIS — K52.9 COLITIS: ICD-10-CM

## 2019-07-25 DIAGNOSIS — A41.9 SEVERE SEPSIS: Primary | ICD-10-CM

## 2019-07-25 DIAGNOSIS — R65.20 SEVERE SEPSIS: Primary | ICD-10-CM

## 2019-07-25 DIAGNOSIS — K52.9 ENTEROCOLITIS: ICD-10-CM

## 2019-07-25 DIAGNOSIS — A41.9 SEPSIS: ICD-10-CM

## 2019-07-25 PROBLEM — R79.89 ELEVATED LACTIC ACID LEVEL: Status: ACTIVE | Noted: 2019-07-25

## 2019-07-25 PROBLEM — E87.6 HYPOKALEMIA: Status: ACTIVE | Noted: 2019-07-25

## 2019-07-25 LAB
ALBUMIN SERPL BCP-MCNC: 3.6 G/DL (ref 3.5–5.2)
ALP SERPL-CCNC: 51 U/L (ref 55–135)
ALT SERPL W/O P-5'-P-CCNC: 12 U/L (ref 10–44)
ANION GAP SERPL CALC-SCNC: 12 MMOL/L (ref 8–16)
ANISOCYTOSIS BLD QL SMEAR: SLIGHT
AST SERPL-CCNC: 20 U/L (ref 10–40)
B-HCG UR QL: NEGATIVE
BACTERIA #/AREA URNS HPF: ABNORMAL /HPF
BASOPHILS # BLD AUTO: 0.01 K/UL (ref 0–0.2)
BASOPHILS NFR BLD: 0.2 % (ref 0–1.9)
BILIRUB SERPL-MCNC: 0.4 MG/DL (ref 0.1–1)
BILIRUB UR QL STRIP: ABNORMAL
BUN SERPL-MCNC: 12 MG/DL (ref 6–20)
CALCIUM SERPL-MCNC: 9.4 MG/DL (ref 8.7–10.5)
CHLORIDE SERPL-SCNC: 102 MMOL/L (ref 95–110)
CLARITY UR: ABNORMAL
CO2 SERPL-SCNC: 22 MMOL/L (ref 23–29)
COLOR UR: ABNORMAL
CREAT SERPL-MCNC: 1.1 MG/DL (ref 0.5–1.4)
CTP QC/QA: YES
CTP QC/QA: YES
DIFFERENTIAL METHOD: ABNORMAL
EOSINOPHIL # BLD AUTO: 0 K/UL (ref 0–0.5)
EOSINOPHIL NFR BLD: 0 % (ref 0–8)
ERYTHROCYTE [DISTWIDTH] IN BLOOD BY AUTOMATED COUNT: 18.3 % (ref 11.5–14.5)
EST. GFR  (AFRICAN AMERICAN): >60 ML/MIN/1.73 M^2
EST. GFR  (NON AFRICAN AMERICAN): >60 ML/MIN/1.73 M^2
FLUAV AG NPH QL: NEGATIVE
FLUBV AG NPH QL: NEGATIVE
GLUCOSE SERPL-MCNC: 121 MG/DL (ref 70–110)
GLUCOSE UR QL STRIP: NEGATIVE
HCT VFR BLD AUTO: 30.8 % (ref 37–48.5)
HGB BLD-MCNC: 9 G/DL (ref 12–16)
HGB UR QL STRIP: ABNORMAL
HYALINE CASTS #/AREA URNS LPF: 0 /LPF
HYPOCHROMIA BLD QL SMEAR: ABNORMAL
KETONES UR QL STRIP: ABNORMAL
LACTATE SERPL-SCNC: 1.8 MMOL/L (ref 0.5–2.2)
LACTATE SERPL-SCNC: 3.6 MMOL/L (ref 0.5–2.2)
LEUKOCYTE ESTERASE UR QL STRIP: NEGATIVE
LYMPHOCYTES # BLD AUTO: 0.6 K/UL (ref 1–4.8)
LYMPHOCYTES NFR BLD: 8.5 % (ref 18–48)
MCH RBC QN AUTO: 19.7 PG (ref 27–31)
MCHC RBC AUTO-ENTMCNC: 29.2 G/DL (ref 32–36)
MCV RBC AUTO: 67 FL (ref 82–98)
MICROSCOPIC COMMENT: ABNORMAL
MONOCYTES # BLD AUTO: 0.4 K/UL (ref 0.3–1)
MONOCYTES NFR BLD: 5.3 % (ref 4–15)
NEUTROPHILS # BLD AUTO: 5.7 K/UL (ref 1.8–7.7)
NEUTROPHILS NFR BLD: 86.2 % (ref 38–73)
NITRITE UR QL STRIP: NEGATIVE
PH UR STRIP: 5 [PH] (ref 5–8)
PLATELET # BLD AUTO: 199 K/UL (ref 150–350)
PMV BLD AUTO: ABNORMAL FL (ref 9.2–12.9)
POIKILOCYTOSIS BLD QL SMEAR: SLIGHT
POTASSIUM SERPL-SCNC: 2.9 MMOL/L (ref 3.5–5.1)
PROCALCITONIN SERPL IA-MCNC: 0.41 NG/ML
PROT SERPL-MCNC: 8.4 G/DL (ref 6–8.4)
PROT UR QL STRIP: ABNORMAL
RBC # BLD AUTO: 4.58 M/UL (ref 4–5.4)
RBC #/AREA URNS HPF: 50 /HPF (ref 0–4)
SODIUM SERPL-SCNC: 136 MMOL/L (ref 136–145)
SP GR UR STRIP: >1.03 (ref 1–1.03)
SQUAMOUS #/AREA URNS HPF: ABNORMAL /HPF
T4 FREE SERPL-MCNC: 0.95 NG/DL (ref 0.71–1.51)
TSH SERPL DL<=0.005 MIU/L-ACNC: 0.53 UIU/ML (ref 0.4–4)
URN SPEC COLLECT METH UR: ABNORMAL
UROBILINOGEN UR STRIP-ACNC: NEGATIVE EU/DL
WBC # BLD AUTO: 6.61 K/UL (ref 3.9–12.7)
WBC #/AREA URNS HPF: 2 /HPF (ref 0–5)

## 2019-07-25 PROCEDURE — 83605 ASSAY OF LACTIC ACID: CPT

## 2019-07-25 PROCEDURE — 84481 FREE ASSAY (FT-3): CPT

## 2019-07-25 PROCEDURE — 81000 URINALYSIS NONAUTO W/SCOPE: CPT

## 2019-07-25 PROCEDURE — 93010 EKG 12-LEAD: ICD-10-PCS | Mod: ,,, | Performed by: INTERNAL MEDICINE

## 2019-07-25 PROCEDURE — 93005 ELECTROCARDIOGRAM TRACING: CPT

## 2019-07-25 PROCEDURE — 25000003 PHARM REV CODE 250

## 2019-07-25 PROCEDURE — 25500020 PHARM REV CODE 255: Performed by: EMERGENCY MEDICINE

## 2019-07-25 PROCEDURE — 87045 FECES CULTURE AEROBIC BACT: CPT

## 2019-07-25 PROCEDURE — 84439 ASSAY OF FREE THYROXINE: CPT

## 2019-07-25 PROCEDURE — 63600175 PHARM REV CODE 636 W HCPCS: Performed by: HOSPITALIST

## 2019-07-25 PROCEDURE — 96361 HYDRATE IV INFUSION ADD-ON: CPT

## 2019-07-25 PROCEDURE — 63600175 PHARM REV CODE 636 W HCPCS: Performed by: NURSE PRACTITIONER

## 2019-07-25 PROCEDURE — 11000001 HC ACUTE MED/SURG PRIVATE ROOM

## 2019-07-25 PROCEDURE — 96365 THER/PROPH/DIAG IV INF INIT: CPT

## 2019-07-25 PROCEDURE — 87427 SHIGA-LIKE TOXIN AG IA: CPT

## 2019-07-25 PROCEDURE — 84443 ASSAY THYROID STIM HORMONE: CPT

## 2019-07-25 PROCEDURE — 63600175 PHARM REV CODE 636 W HCPCS: Performed by: EMERGENCY MEDICINE

## 2019-07-25 PROCEDURE — 80053 COMPREHEN METABOLIC PANEL: CPT

## 2019-07-25 PROCEDURE — 84145 PROCALCITONIN (PCT): CPT

## 2019-07-25 PROCEDURE — 93010 ELECTROCARDIOGRAM REPORT: CPT | Mod: ,,, | Performed by: INTERNAL MEDICINE

## 2019-07-25 PROCEDURE — 96372 THER/PROPH/DIAG INJ SC/IM: CPT | Mod: 59

## 2019-07-25 PROCEDURE — 87040 BLOOD CULTURE FOR BACTERIA: CPT | Mod: 59

## 2019-07-25 PROCEDURE — 87046 STOOL CULTR AEROBIC BACT EA: CPT | Mod: 59

## 2019-07-25 PROCEDURE — 25000003 PHARM REV CODE 250: Performed by: EMERGENCY MEDICINE

## 2019-07-25 PROCEDURE — 36415 COLL VENOUS BLD VENIPUNCTURE: CPT

## 2019-07-25 PROCEDURE — S0030 INJECTION, METRONIDAZOLE: HCPCS | Performed by: EMERGENCY MEDICINE

## 2019-07-25 PROCEDURE — 99285 EMERGENCY DEPT VISIT HI MDM: CPT | Mod: 25

## 2019-07-25 PROCEDURE — 87502 INFLUENZA DNA AMP PROBE: CPT

## 2019-07-25 PROCEDURE — 81025 URINE PREGNANCY TEST: CPT | Performed by: NURSE PRACTITIONER

## 2019-07-25 PROCEDURE — 85025 COMPLETE CBC W/AUTO DIFF WBC: CPT

## 2019-07-25 PROCEDURE — 87209 SMEAR COMPLEX STAIN: CPT

## 2019-07-25 RX ORDER — SODIUM CHLORIDE 9 MG/ML
1000 INJECTION, SOLUTION INTRAVENOUS CONTINUOUS
Status: DISCONTINUED | OUTPATIENT
Start: 2019-07-25 | End: 2019-07-26

## 2019-07-25 RX ORDER — DICYCLOMINE HYDROCHLORIDE 10 MG/1
20 CAPSULE ORAL
Status: DISCONTINUED | OUTPATIENT
Start: 2019-07-25 | End: 2019-07-27 | Stop reason: HOSPADM

## 2019-07-25 RX ORDER — SODIUM CHLORIDE 0.9 % (FLUSH) 0.9 %
10 SYRINGE (ML) INJECTION
Status: DISCONTINUED | OUTPATIENT
Start: 2019-07-25 | End: 2019-07-27 | Stop reason: HOSPADM

## 2019-07-25 RX ORDER — HYDROMORPHONE HYDROCHLORIDE 2 MG/ML
0.5 INJECTION, SOLUTION INTRAMUSCULAR; INTRAVENOUS; SUBCUTANEOUS EVERY 6 HOURS PRN
Status: DISCONTINUED | OUTPATIENT
Start: 2019-07-25 | End: 2019-07-26

## 2019-07-25 RX ORDER — ACETAMINOPHEN 500 MG
1000 TABLET ORAL
Status: COMPLETED | OUTPATIENT
Start: 2019-07-25 | End: 2019-07-25

## 2019-07-25 RX ORDER — POTASSIUM CHLORIDE 20 MEQ/15ML
40 SOLUTION ORAL
Status: COMPLETED | OUTPATIENT
Start: 2019-07-25 | End: 2019-07-25

## 2019-07-25 RX ORDER — ACETAMINOPHEN 325 MG/1
TABLET ORAL
Status: COMPLETED
Start: 2019-07-25 | End: 2019-07-25

## 2019-07-25 RX ORDER — CIPROFLOXACIN 2 MG/ML
400 INJECTION, SOLUTION INTRAVENOUS
Status: DISCONTINUED | OUTPATIENT
Start: 2019-07-25 | End: 2019-07-27 | Stop reason: HOSPADM

## 2019-07-25 RX ORDER — DICYCLOMINE HYDROCHLORIDE 10 MG/ML
20 INJECTION INTRAMUSCULAR
Status: COMPLETED | OUTPATIENT
Start: 2019-07-25 | End: 2019-07-25

## 2019-07-25 RX ORDER — DICYCLOMINE HYDROCHLORIDE 10 MG/1
20 CAPSULE ORAL
Status: DISCONTINUED | OUTPATIENT
Start: 2019-07-26 | End: 2019-07-25

## 2019-07-25 RX ORDER — ACETAMINOPHEN 325 MG/1
650 TABLET ORAL ONCE
Status: COMPLETED | OUTPATIENT
Start: 2019-07-25 | End: 2019-07-25

## 2019-07-25 RX ORDER — METRONIDAZOLE 500 MG/100ML
500 INJECTION, SOLUTION INTRAVENOUS
Status: DISCONTINUED | OUTPATIENT
Start: 2019-07-25 | End: 2019-07-27 | Stop reason: HOSPADM

## 2019-07-25 RX ORDER — SODIUM CHLORIDE 9 MG/ML
1000 INJECTION, SOLUTION INTRAVENOUS CONTINUOUS
Status: DISCONTINUED | OUTPATIENT
Start: 2019-07-25 | End: 2019-07-25

## 2019-07-25 RX ORDER — ONDANSETRON 2 MG/ML
4 INJECTION INTRAMUSCULAR; INTRAVENOUS EVERY 6 HOURS PRN
Status: DISCONTINUED | OUTPATIENT
Start: 2019-07-25 | End: 2019-07-25

## 2019-07-25 RX ORDER — ONDANSETRON 2 MG/ML
8 INJECTION INTRAMUSCULAR; INTRAVENOUS EVERY 8 HOURS PRN
Status: DISCONTINUED | OUTPATIENT
Start: 2019-07-25 | End: 2019-07-27 | Stop reason: HOSPADM

## 2019-07-25 RX ADMIN — SODIUM CHLORIDE 1000 ML: 0.9 INJECTION, SOLUTION INTRAVENOUS at 08:07

## 2019-07-25 RX ADMIN — ACETAMINOPHEN 650 MG: 325 TABLET ORAL at 08:07

## 2019-07-25 RX ADMIN — SODIUM CHLORIDE 1000 ML: 0.9 INJECTION, SOLUTION INTRAVENOUS at 05:07

## 2019-07-25 RX ADMIN — ACETAMINOPHEN 650 MG: 325 TABLET, FILM COATED ORAL at 08:07

## 2019-07-25 RX ADMIN — HYDROMORPHONE HYDROCHLORIDE 0.5 MG: 2 INJECTION, SOLUTION INTRAMUSCULAR; INTRAVENOUS; SUBCUTANEOUS at 05:07

## 2019-07-25 RX ADMIN — CIPROFLOXACIN 400 MG: 2 INJECTION, SOLUTION INTRAVENOUS at 09:07

## 2019-07-25 RX ADMIN — VANCOMYCIN HYDROCHLORIDE 2500 MG: 500 INJECTION, POWDER, LYOPHILIZED, FOR SOLUTION INTRAVENOUS at 03:07

## 2019-07-25 RX ADMIN — DICYCLOMINE HYDROCHLORIDE 20 MG: 20 INJECTION, SOLUTION INTRAMUSCULAR at 04:07

## 2019-07-25 RX ADMIN — POTASSIUM CHLORIDE 40 MEQ: 20 SOLUTION ORAL at 02:07

## 2019-07-25 RX ADMIN — PIPERACILLIN AND TAZOBACTAM 4.5 G: 4; .5 INJECTION, POWDER, LYOPHILIZED, FOR SOLUTION INTRAVENOUS; PARENTERAL at 02:07

## 2019-07-25 RX ADMIN — METRONIDAZOLE 500 MG: 500 INJECTION, SOLUTION INTRAVENOUS at 08:07

## 2019-07-25 RX ADMIN — IOHEXOL 100 ML: 350 INJECTION, SOLUTION INTRAVENOUS at 03:07

## 2019-07-25 RX ADMIN — HYDROMORPHONE HYDROCHLORIDE 0.5 MG: 2 INJECTION, SOLUTION INTRAMUSCULAR; INTRAVENOUS; SUBCUTANEOUS at 08:07

## 2019-07-25 RX ADMIN — ACETAMINOPHEN 1000 MG: 500 TABLET ORAL at 01:07

## 2019-07-25 RX ADMIN — SODIUM CHLORIDE 2586 ML: 0.9 INJECTION, SOLUTION INTRAVENOUS at 01:07

## 2019-07-25 RX ADMIN — SODIUM CHLORIDE 1000 ML: 0.9 INJECTION, SOLUTION INTRAVENOUS at 07:07

## 2019-07-25 NOTE — ED PROVIDER NOTES
Encounter Date: 2019       History     Chief Complaint   Patient presents with    Fever     Reports having fever since yesterday. Denies any antipyrectic. States also having n/v, diarrhea with accompanied abdominal pain. Reports receiving and  2 months ago     24 y.o. female History reviewed. No pertinent past medical history. Notes sick baby at home with ear infection/cold, states this morning she woke up with n/v/d, mild headache without neck stiffness, nasal congestion, denies sore throat/cough. Does note lower abd cramping. Currently on menstrual cycle. Denies dysuria/vaginal discharge. She did have a D and C 2 months ago but denies problems with that. She did have a menstrual cycle last month.        Review of patient's allergies indicates:  No Known Allergies  History reviewed. No pertinent past medical history.  Past Surgical History:   Procedure Laterality Date    INDUCED        Family History   Problem Relation Age of Onset    Hypertension Maternal Grandfather     No Known Problems Mother     Hypertension Father      Social History     Tobacco Use    Smoking status: Current Some Day Smoker    Smokeless tobacco: Never Used   Substance Use Topics    Alcohol use: Yes     Alcohol/week: 0.0 oz     Comment: occasionally    Drug use: Yes     Types: Marijuana     Review of Systems   Constitutional: Negative for fever.   HENT: Negative for sore throat.    Respiratory: Negative for shortness of breath.    Cardiovascular: Negative for chest pain.   Gastrointestinal: Negative for nausea.   Genitourinary: Negative for dysuria.   Musculoskeletal: Negative for back pain.   Skin: Negative for rash.   Neurological: Negative for weakness.   Hematological: Does not bruise/bleed easily.   All other systems reviewed and are negative.      Physical Exam     Initial Vitals [19 1302]   BP Pulse Resp Temp SpO2   130/73 (!) 154 18 (!) 101.3 °F (38.5 °C) 100 %      MAP       --         Physical  Exam    Nursing note and vitals reviewed.  Constitutional: She appears well-developed and well-nourished.   HENT:   Head: Normocephalic and atraumatic.   Eyes: Conjunctivae and EOM are normal. Pupils are equal, round, and reactive to light.   Neck: Normal range of motion.   Cardiovascular: Normal rate and regular rhythm.   Pulmonary/Chest: Breath sounds normal. No respiratory distress.   Abdominal: Soft. She exhibits no distension.   Musculoskeletal: Normal range of motion.   Neurological: She is alert. No cranial nerve deficit. GCS score is 15. GCS eye subscore is 4. GCS verbal subscore is 5. GCS motor subscore is 6.   Skin: Skin is warm and dry.   Psychiatric: She has a normal mood and affect. Thought content normal.       Minimal suprapubic ttp  Pt is well appearing, smiling, non toxic  ED Course   Procedures  Labs Reviewed   CULTURE, BLOOD   CULTURE, BLOOD   CBC W/ AUTO DIFFERENTIAL   COMPREHENSIVE METABOLIC PANEL   LACTIC ACID, PLASMA   URINALYSIS, REFLEX TO URINE CULTURE   POCT URINE PREGNANCY   POCT INFLUENZA A/B     EKG Readings: (Independently Interpreted)   Hr 124, sinus tach, nl axis/intervals, no yaron/twi, non acute, no stemi.       CT Abdomen Pelvis With Contrast   Final Result      Marked wall thickening of the terminal ileum, right-sided colon, and transverse colon with lesser degree of wall thickening involving the remainder of the large bowel.  The findings are consistent with enterocolitis.  No evidence of bowel obstruction.      Borderline splenomegaly.         Electronically signed by: Bart Rhodes MD   Date:    07/25/2019   Time:    16:20      X-Ray Chest AP Portable   Final Result      No evidence of active cardiopulmonary disease.         Electronically signed by: Jl Gunter MD   Date:    07/25/2019   Time:    13:38          Pt is septic, unclear if viral or bacterial. Will admit for iv abx.     Medical Decision Making:   Clinical Tests:   Sepsis Perfusion Assessment: I aly dunn sepsis  perfusion exam was performed within 6 hours of Septic Shock presentation, following fluid resuscitation.              Critical care time not including billable procedures 60 min.        Clinical Impression:       ICD-10-CM ICD-9-CM   1. Sepsis A41.9 038.9     995.91   2. Enterocolitis K52.9 558.9                                Dayton Reyna MD  07/25/19 1700

## 2019-07-25 NOTE — PROGRESS NOTES
Pharmacokinetic Initial Assessment: IV Vancomycin    Assessment/Plan:    Initiate intravenous vancomycin with loading dose of 2500 mg once followed by a maintenance dose of vancomycin 1250 mg IV every 12 hours  Desired empiric serum trough concentration is 10 to 20 mcg/mL.  Draw vancomycin trough level 30 min prior to fourth dose on 7/27/19  at approximately 03:30   Pharmacy will continue to follow and monitor vancomycin.      Please contact pharmacy at extension [phone] with any questions regarding this assessment.     Thank you for the consult,   Dionne Guevara     Patient brief summary:  Aziza Carter is a 24 y.o. female initiated on antimicrobial therapy with IV Vancomycin for treatment of suspected sepsis    Drug Allergies:   Review of patient's allergies indicates:  No Known Allergies    Actual Body Weight:   86.2 kg    Renal Function:   Estimated Creatinine Clearance: 84.8 mL/min (based on SCr of 1.1 mg/dL).,     Dialysis Method (if applicable):    CBC (last 72 hours):  Recent Labs   Lab Result Units 07/25/19  1336   WBC K/uL 6.61   Hemoglobin g/dL 9.0*   Hematocrit % 30.8*   Platelets K/uL 199   Gran% % 86.2*   Lymph% % 8.5*   Mono% % 5.3   Eosinophil% % 0.0   Basophil% % 0.2   Differential Method  Automated       Metabolic Panel (last 72 hours):  Recent Labs   Lab Result Units 07/25/19  1336 07/25/19  1435   Sodium mmol/L 136  --    Potassium mmol/L 2.9*  --    Chloride mmol/L 102  --    CO2 mmol/L 22*  --    Glucose mg/dL 121*  --    Glucose, UA   --  Negative   BUN, Bld mg/dL 12  --    Creatinine mg/dL 1.1  --    Albumin g/dL 3.6  --    Total Bilirubin mg/dL 0.4  --    Alkaline Phosphatase U/L 51*  --    AST U/L 20  --    ALT U/L 12  --        Drug levels (last 3 results):  No results for input(s): VANCOMYCINRA, VANCOMYCINPE, VANCOMYCINTR in the last 72 hours.    Microbiologic Results:  Microbiology Results (last 7 days)       Procedure Component Value Units Date/Time    Stool culture **CANNOT BE  ORDERED STAT** [[id number]]     Order Status:  No result Specimen:  Stool     Blood culture x two cultures. Draw prior to antibiotics. [[id number]] Collected:  07/25/19 1425    Order Status:  Sent Specimen:  Blood from Peripheral, Hand, Left Updated:  07/25/19 1441    Blood culture x two cultures. Draw prior to antibiotics. [[id number]] Collected:  07/25/19 1430    Order Status:  Sent Specimen:  Blood from Peripheral, Hand, Right Updated:  07/25/19 1441

## 2019-07-25 NOTE — ED TRIAGE NOTES
Pt presents to ED for fever, abdominal cramps, nausea, diarrhea, and dizziness times 1 day. Pt states she has had 2 episodes of diarrhea today. No medications taken PTA

## 2019-07-26 PROBLEM — R65.20 SEVERE SEPSIS: Status: ACTIVE | Noted: 2019-07-25

## 2019-07-26 LAB
ANION GAP SERPL CALC-SCNC: 7 MMOL/L (ref 8–16)
ANISOCYTOSIS BLD QL SMEAR: SLIGHT
BASOPHILS # BLD AUTO: 0.01 K/UL (ref 0–0.2)
BASOPHILS NFR BLD: 0.2 % (ref 0–1.9)
BUN SERPL-MCNC: 6 MG/DL (ref 6–20)
C DIFF GDH STL QL: NEGATIVE
C DIFF TOX A+B STL QL IA: NEGATIVE
CALCIUM SERPL-MCNC: 8 MG/DL (ref 8.7–10.5)
CHLORIDE SERPL-SCNC: 108 MMOL/L (ref 95–110)
CO2 SERPL-SCNC: 21 MMOL/L (ref 23–29)
CREAT SERPL-MCNC: 0.8 MG/DL (ref 0.5–1.4)
CRP SERPL-MCNC: 199.9 MG/L (ref 0–8.2)
DIFFERENTIAL METHOD: ABNORMAL
EOSINOPHIL # BLD AUTO: 0 K/UL (ref 0–0.5)
EOSINOPHIL NFR BLD: 0.2 % (ref 0–8)
ERYTHROCYTE [DISTWIDTH] IN BLOOD BY AUTOMATED COUNT: 18.5 % (ref 11.5–14.5)
ERYTHROCYTE [SEDIMENTATION RATE] IN BLOOD BY WESTERGREN METHOD: 46 MM/HR (ref 0–20)
EST. GFR  (AFRICAN AMERICAN): >60 ML/MIN/1.73 M^2
EST. GFR  (NON AFRICAN AMERICAN): >60 ML/MIN/1.73 M^2
GIANT PLATELETS BLD QL SMEAR: PRESENT
GLUCOSE SERPL-MCNC: 111 MG/DL (ref 70–110)
HCT VFR BLD AUTO: 26.8 % (ref 37–48.5)
HGB BLD-MCNC: 7.9 G/DL (ref 12–16)
HYPOCHROMIA BLD QL SMEAR: ABNORMAL
LACTATE SERPL-SCNC: 2.3 MMOL/L (ref 0.5–2.2)
LYMPHOCYTES # BLD AUTO: 0.7 K/UL (ref 1–4.8)
LYMPHOCYTES NFR BLD: 12.1 % (ref 18–48)
MAGNESIUM SERPL-MCNC: 1.6 MG/DL (ref 1.6–2.6)
MCH RBC QN AUTO: 19.7 PG (ref 27–31)
MCHC RBC AUTO-ENTMCNC: 29.5 G/DL (ref 32–36)
MCV RBC AUTO: 67 FL (ref 82–98)
MONOCYTES # BLD AUTO: 0.3 K/UL (ref 0.3–1)
MONOCYTES NFR BLD: 5.7 % (ref 4–15)
NEUTROPHILS # BLD AUTO: 4.9 K/UL (ref 1.8–7.7)
NEUTROPHILS NFR BLD: 82 % (ref 38–73)
OVALOCYTES BLD QL SMEAR: ABNORMAL
PHOSPHATE SERPL-MCNC: 2.9 MG/DL (ref 2.7–4.5)
PLATELET # BLD AUTO: 143 K/UL (ref 150–350)
PLATELET BLD QL SMEAR: ABNORMAL
PMV BLD AUTO: ABNORMAL FL (ref 9.2–12.9)
POTASSIUM SERPL-SCNC: 2.8 MMOL/L (ref 3.5–5.1)
RBC # BLD AUTO: 4.01 M/UL (ref 4–5.4)
SODIUM SERPL-SCNC: 136 MMOL/L (ref 136–145)
T3FREE SERPL-MCNC: 1.9 PG/ML (ref 2.3–4.2)
WBC # BLD AUTO: 5.95 K/UL (ref 3.9–12.7)
WBC #/AREA STL HPF: ABNORMAL /[HPF]

## 2019-07-26 PROCEDURE — 36415 COLL VENOUS BLD VENIPUNCTURE: CPT

## 2019-07-26 PROCEDURE — 63600175 PHARM REV CODE 636 W HCPCS: Performed by: HOSPITALIST

## 2019-07-26 PROCEDURE — 11000001 HC ACUTE MED/SURG PRIVATE ROOM

## 2019-07-26 PROCEDURE — 85652 RBC SED RATE AUTOMATED: CPT

## 2019-07-26 PROCEDURE — 85025 COMPLETE CBC W/AUTO DIFF WBC: CPT

## 2019-07-26 PROCEDURE — 83993 ASSAY FOR CALPROTECTIN FECAL: CPT

## 2019-07-26 PROCEDURE — 87449 NOS EACH ORGANISM AG IA: CPT

## 2019-07-26 PROCEDURE — 83605 ASSAY OF LACTIC ACID: CPT

## 2019-07-26 PROCEDURE — 89055 LEUKOCYTE ASSESSMENT FECAL: CPT

## 2019-07-26 PROCEDURE — 83735 ASSAY OF MAGNESIUM: CPT

## 2019-07-26 PROCEDURE — 25000003 PHARM REV CODE 250: Performed by: HOSPITALIST

## 2019-07-26 PROCEDURE — 94761 N-INVAS EAR/PLS OXIMETRY MLT: CPT

## 2019-07-26 PROCEDURE — 84100 ASSAY OF PHOSPHORUS: CPT

## 2019-07-26 PROCEDURE — 86140 C-REACTIVE PROTEIN: CPT

## 2019-07-26 PROCEDURE — S0030 INJECTION, METRONIDAZOLE: HCPCS | Performed by: HOSPITALIST

## 2019-07-26 PROCEDURE — 80048 BASIC METABOLIC PNL TOTAL CA: CPT

## 2019-07-26 RX ORDER — SODIUM CHLORIDE AND POTASSIUM CHLORIDE 150; 900 MG/100ML; MG/100ML
INJECTION, SOLUTION INTRAVENOUS CONTINUOUS
Status: DISCONTINUED | OUTPATIENT
Start: 2019-07-26 | End: 2019-07-27 | Stop reason: HOSPADM

## 2019-07-26 RX ORDER — ACETAMINOPHEN 325 MG/1
650 TABLET ORAL EVERY 8 HOURS PRN
Status: DISCONTINUED | OUTPATIENT
Start: 2019-07-26 | End: 2019-07-27 | Stop reason: HOSPADM

## 2019-07-26 RX ORDER — HYDROMORPHONE HYDROCHLORIDE 2 MG/ML
1 INJECTION, SOLUTION INTRAMUSCULAR; INTRAVENOUS; SUBCUTANEOUS EVERY 4 HOURS PRN
Status: DISCONTINUED | OUTPATIENT
Start: 2019-07-26 | End: 2019-07-26

## 2019-07-26 RX ORDER — POTASSIUM CHLORIDE 20 MEQ/15ML
60 SOLUTION ORAL
Status: COMPLETED | OUTPATIENT
Start: 2019-07-26 | End: 2019-07-26

## 2019-07-26 RX ORDER — OXYCODONE AND ACETAMINOPHEN 5; 325 MG/1; MG/1
1 TABLET ORAL EVERY 4 HOURS PRN
Status: DISCONTINUED | OUTPATIENT
Start: 2019-07-26 | End: 2019-07-27 | Stop reason: HOSPADM

## 2019-07-26 RX ORDER — HYDROMORPHONE HYDROCHLORIDE 2 MG/ML
2 INJECTION, SOLUTION INTRAMUSCULAR; INTRAVENOUS; SUBCUTANEOUS EVERY 4 HOURS PRN
Status: DISCONTINUED | OUTPATIENT
Start: 2019-07-26 | End: 2019-07-27 | Stop reason: HOSPADM

## 2019-07-26 RX ADMIN — POTASSIUM CHLORIDE 60 MEQ: 20 SOLUTION ORAL at 08:07

## 2019-07-26 RX ADMIN — SODIUM CHLORIDE AND POTASSIUM CHLORIDE: .9; .15 SOLUTION INTRAVENOUS at 08:07

## 2019-07-26 RX ADMIN — METRONIDAZOLE 500 MG: 500 INJECTION, SOLUTION INTRAVENOUS at 09:07

## 2019-07-26 RX ADMIN — DICYCLOMINE HYDROCHLORIDE 20 MG: 10 CAPSULE ORAL at 06:07

## 2019-07-26 RX ADMIN — ACETAMINOPHEN 650 MG: 325 TABLET, FILM COATED ORAL at 12:07

## 2019-07-26 RX ADMIN — VANCOMYCIN HYDROCHLORIDE 1250 MG: 1.25 INJECTION, POWDER, LYOPHILIZED, FOR SOLUTION INTRAVENOUS at 05:07

## 2019-07-26 RX ADMIN — CIPROFLOXACIN 400 MG: 2 INJECTION, SOLUTION INTRAVENOUS at 08:07

## 2019-07-26 RX ADMIN — DICYCLOMINE HYDROCHLORIDE 20 MG: 10 CAPSULE ORAL at 11:07

## 2019-07-26 RX ADMIN — OXYCODONE HYDROCHLORIDE AND ACETAMINOPHEN 1 TABLET: 5; 325 TABLET ORAL at 11:07

## 2019-07-26 RX ADMIN — OXYCODONE HYDROCHLORIDE AND ACETAMINOPHEN 1 TABLET: 5; 325 TABLET ORAL at 10:07

## 2019-07-26 RX ADMIN — SODIUM CHLORIDE AND POTASSIUM CHLORIDE: .9; .15 SOLUTION INTRAVENOUS at 10:07

## 2019-07-26 RX ADMIN — METRONIDAZOLE 500 MG: 500 INJECTION, SOLUTION INTRAVENOUS at 11:07

## 2019-07-26 RX ADMIN — METRONIDAZOLE 500 MG: 500 INJECTION, SOLUTION INTRAVENOUS at 03:07

## 2019-07-26 RX ADMIN — DICYCLOMINE HYDROCHLORIDE 20 MG: 10 CAPSULE ORAL at 12:07

## 2019-07-26 RX ADMIN — CIPROFLOXACIN 400 MG: 2 INJECTION, SOLUTION INTRAVENOUS at 09:07

## 2019-07-26 RX ADMIN — DICYCLOMINE HYDROCHLORIDE 20 MG: 10 CAPSULE ORAL at 10:07

## 2019-07-26 RX ADMIN — OXYCODONE HYDROCHLORIDE AND ACETAMINOPHEN 1 TABLET: 5; 325 TABLET ORAL at 04:07

## 2019-07-26 RX ADMIN — ONDANSETRON 8 MG: 2 INJECTION INTRAMUSCULAR; INTRAVENOUS at 06:07

## 2019-07-26 NOTE — PLAN OF CARE
"TN met with pt at bedside. TN explained her role in Care Management. Pt voiced understanding. TN inquired about HELP AT HOME. Pt stated that she will have mother at home to help for support. TN voiced understanding. TN inquired about responsibilities when it comes to  MANAGING HER HEALTH at home and what it entails. Pt inquired about details. TN informed pt of RESPONSIBILITIES of:    1. Follow up appointments  2. Getting Prescriptions filled  3. Taking medications as prescribed.     Pt voiced understanding.  TN explained "My Health Packet" blue folder and the pink and green tabs that are on the folder as well.  Pt voiced understanding.     Pt's pharmacy:   [facility] [id number]  [address]  [place] [id number]  Phone: [phone] Fax: [phone]    Pt's preference for appointments: 10:00 AM appts       07/26/19 1542   Discharge Assessment   Assessment Type Discharge Planning Assessment   Confirmed/corrected address and phone number on facesheet? Yes   Assessment information obtained from? Patient   Communicated expected length of stay with patient/caregiver no   Prior to hospitilization cognitive status: Alert/Oriented   Prior to hospitalization functional status: Independent   Current cognitive status: Alert/Oriented   Current Functional Status: Independent   Facility Arrived From:   (Home)   Lives With parent(s)   Able to Return to Prior Arrangements yes   Is patient able to care for self after discharge? Yes   Who are your caregiver(s) and their phone number(s)?   (Mother, Samantha Carter, [phone])   Patient's perception of discharge disposition home or selfcare   Readmission Within the Last 30 Days no previous admission in last 30 days   Patient currently being followed by outpatient case management? No   Patient currently receives any other outside agency services? No   Equipment Currently Used at Home none   Do you have any problems affording any of " your prescribed medications? No   Is the patient taking medications as prescribed? yes   Does the patient have transportation home? Yes   Transportation Anticipated family or friend will provide   Does the patient receive services at the [facility]? No   Discharge Plan A Home with family   DME Needed Upon Discharge  none   Patient/Family in Agreement with Plan yes

## 2019-07-26 NOTE — PROGRESS NOTES
Mews score 5: Nurse Latonia notified Dr. Maria  Tylenol ordered and given  Will continue to monitor

## 2019-07-26 NOTE — H&P
[facility] Hospital Medicine  History & Physical    Patient Name: Aziza Carter  MRN: [id number]  Admission Date: 2019  Attending Physician: Won Maria MD, MPH      PCP:     Lore Rodriguez NP    CC:     Chief Complaint   Patient presents with    Fever     Reports having fever since yesterday. Denies any antipyrectic. States also having n/v, diarrhea with accompanied abdominal pain. Reports receiving and  2 months ago       HISTORY OF PRESENT ILLNESS:     Aziza Carter is a 24 y.o. female that (in part)  has no past medical history on file.  has a past surgical history that includes Induced  (2019). Presents to [facility] Emergency Department complaining of a fever.  Associated with nausea, vomiting, diarrhea, and periumbilical abdominal pain. Subacute onset with progressive worsening.  Moderate to severe intensity.  Radiation throughout abdomen to back.  Daily frequency.  Constant duration.  Patient reports having a portion 2 months ago.  Denies sick contacts or recent travel.  Does not think she contaminated food.  No hematemesis, melena, dyspepsia, or previous gastrointestinal problems to report.    In the emergency department routine laboratory studies, urinalysis, and CT of the abdomen pelvis obtained.  There is evidence enterocolitis.  Patient showing evidence of sepsis with tachycardia, tachypnea, and fever in the ED.  She also had hypokalemia.  Cultures were obtained and she was given broad-spectrum antibiotics, IV fluids, and potassium replacement.  Sepsis protocol initiated.  GI is consulted.    Hospital medicine has been asked to admit for further evaluation and treatment.       REVIEW OF SYSTEMS:     -- Constitutional:  Fever and generalized malaise.  Chills and rigors.  -- Eyes: No visual changes, diplopia, pain, tearing, blind spots, or discharge.   -- Ears, nose, mouth, throat, and face: No congestion, sore throat,  epistaxis, d/c, bleeding gums, neck stiffness masses, or dental issues.  -- Respiratory: No cough, shortness of breath, hemoptysis, stridor, wheezing, or night sweats.  -- Cardiovascular: No chest pain, GRECO, syncope, PND, edema, cyanosis, or palpitations.   -- Gastrointestinal:  As above in HPI.  -- Genitourinary: No hematuria, dysuria, frequency, urgency, nocturia, polyuria, stones, or incontinence.  -- Integument/breast: No rash, pruritis, pigmentation changes, dryness, or changes in hair  -- Hematologic/lymphatic: No easy bruising or lymphadenopathy.   -- Musculoskeletal: No acute arthralgias, acute myalgias, joint swelling, acute limitations of ROM, or acute muscular weakness.  -- Neurological: No seizures, headaches, incoordination, paraesthesias, ataxia, vertigo, or tremors.  -- Behavioral/Psych: No auditory or visual hallucinations, depression, or suicidal/homicidal ideations.  -- Endocrine: No heat or cold intolerance, polydipsia, or unintentional weight gain / loss.  -- Allergy/Immunologic: No recurrent infections or adverse reaction to food, insects, or difficulty breathing.      PAST MEDICAL / SURGICAL HISTORY:   History reviewed. No pertinent past medical history.  Past Surgical History:   Procedure Laterality Date    INDUCED   2019         FAMILY HISTORY:     Family History   Problem Relation Age of Onset    Hypertension Maternal Grandfather     No Known Problems Mother     Hypertension Father          SOCIAL HISTORY:     Social History     Socioeconomic History    Marital status: Single     Spouse name: Not on file    Number of children: Not on file    Years of education: Not on file    Highest education level: Not on file   Occupational History    Not on file   Social Needs    Financial resource strain: Not on file    Food insecurity:     Worry: Not on file     Inability: Not on file    Transportation needs:     Medical: Not on file     Non-medical: Not on file   Tobacco Use     Smoking status: Never Smoker    Smokeless tobacco: Never Used   Substance and Sexual Activity    Alcohol use: Yes     Alcohol/week: 0.0 oz     Comment: occasionally    Drug use: Yes     Types: Marijuana    Sexual activity: Not Currently     Partners: Male   Lifestyle    Physical activity:     Days per week: Not on file     Minutes per session: Not on file    Stress: Not on file   Relationships    Social connections:     Talks on phone: Not on file     Gets together: Not on file     Attends Restorationist service: Not on file     Active member of club or organization: Not on file     Attends meetings of clubs or organizations: Not on file     Relationship status: Not on file   Other Topics Concern    Not on file   Social History Narrative    Not on file         ALLERGIES:       Review of patient's allergies indicates:  No Known Allergies        HOME MEDICATIONS:     Prior to Admission medications    Medication Sig Start Date End Date Taking? Authorizing Provider   benzonatate (TESSALON) 100 MG capsule Take 1 capsule (100 mg total) by mouth 3 (three) times daily as needed for Cough. 11/19/18   Meryl Holbrook PA-C   cetirizine (ZYRTEC) 10 MG tablet Take 1 tablet (10 mg total) by mouth once daily. 12/30/17 12/30/18  Surendra Terrell PA-C   famotidine (PEPCID) 20 MG tablet Take 1 tablet (20 mg total) by mouth 2 (two) times daily before meals. for 30 days 6/21/18 7/21/18  Meryl Holbrook PA-C   fluticasone (FLONASE) 50 mcg/actuation nasal spray 1 spray by Each Nare route 2 (two) times daily as needed for Rhinitis. 12/30/17   Surendra Terrell PA-C   ondansetron (ZOFRAN) 4 MG tablet Take 1 tablet (4 mg total) by mouth every 8 (eight) hours as needed for Nausea. 3/22/19   Jackie Bejarano, NP   pantoprazole (PROTONIX) 20 MG tablet Take 1 tablet (20 mg total) by mouth once daily. 2/2/19 3/4/19  Nicole Mcclelland PA-C          [facility] MEDICATIONS:     Scheduled Meds:    ciprofloxacin  400 mg Intravenous  "Q12H    metronidazole  500 mg Intravenous Q8H    [START ON 7/26/2019] vancomycin (VANCOCIN) IVPB  1,250 mg Intravenous Q12H     Continuous Infusions:    sodium chloride 0.9%       PRN Meds: HYDROmorphone, ondansetron, sodium chloride 0.9%      PHYSICAL EXAM:     Wt Readings from Last 1 Encounters:   07/25/19 2013 88.5 kg (195 lb)   07/25/19 1302 86.2 kg (190 lb)     Body mass index is 32.45 kg/m².  Vitals:    07/25/19 1900 07/25/19 1953 07/25/19 2009 07/25/19 2013   BP: 114/64 130/70     BP Location: Right arm      Patient Position: Sitting      Pulse: (!) 128 (!) 169     Resp: 20 (!) 24     Temp: 99.9 °F (37.7 °C) (!) 103.1 °F (39.5 °C) (!) 103.1 °F (39.5 °C)    TempSrc: Oral      SpO2: 100% 97%     Weight:    88.5 kg (195 lb)   Height:    5' 5" (1.651 m)          -- General appearance:  Ill-appearing young female who is lying in bed.  Rigors present.  well developed. appears stated age   -- Head: normocephalic, atraumatic   -- Eyes: conjunctivae clear. Extraocular muscles intact  -- Nose: Nares normal. Septum midline.   -- Mouth/Throat: lips, mucosa, and tongue normal. no throat erythema.   -- Neck: supple, symmetrical, trachea midline, no JVD and thyroid not grossly enlarged, appears symmetric  -- Lungs:  Increased respiratory rate.  clear to auscultation bilaterally. normal respiratory effort. No use of accessory muscles.   -- Chest wall: no tenderness. equal bilateral chest rise   -- Heart:  Rapid rate and regular rhythm. S1, S2 normal.  no click, rub or gallop   -- Abdomen:  Periumbilical tenderness upon palpation.  No rebound or guarding.  soft, non-distended, non-tympanic; bowel sounds normal; no masses  -- Extremities: no cyanosis, clubbing or edema.   -- Pulses: 2+ and symmetric   -- Skin:  Febrile.  Diaphoretic.  -- Neurologic: Normal strength and tone. No focal numbness or weakness. CNII-XII intact. Ray coma scale: eyes open spontaneously-4, oriented & converses-5, obeys " commands-6.      LABORATORY STUDIES:     Recent Results (from the past 36 hour(s))   CBC auto differential    Collection Time: 07/25/19  1:36 PM   Result Value Ref Range    WBC 6.61 3.90 - 12.70 K/uL    RBC 4.58 4.00 - 5.40 M/uL    Hemoglobin 9.0 (L) 12.0 - 16.0 g/dL    Hematocrit 30.8 (L) 37.0 - 48.5 %    Mean Corpuscular Volume 67 (L) 82 - 98 fL    Mean Corpuscular Hemoglobin 19.7 (L) 27.0 - 31.0 pg    Mean Corpuscular Hemoglobin Conc 29.2 (L) 32.0 - 36.0 g/dL    RDW 18.3 (H) 11.5 - 14.5 %    Platelets 199 150 - 350 K/uL    MPV SEE COMMENT 9.2 - 12.9 fL    Gran # (ANC) 5.7 1.8 - 7.7 K/uL    Lymph # 0.6 (L) 1.0 - 4.8 K/uL    Mono # 0.4 0.3 - 1.0 K/uL    Eos # 0.0 0.0 - 0.5 K/uL    Baso # 0.01 0.00 - 0.20 K/uL    Gran% 86.2 (H) 38.0 - 73.0 %    Lymph% 8.5 (L) 18.0 - 48.0 %    Mono% 5.3 4.0 - 15.0 %    Eosinophil% 0.0 0.0 - 8.0 %    Basophil% 0.2 0.0 - 1.9 %    Aniso Slight     Poik Slight     Hypo Occasional     Differential Method Automated    Comprehensive metabolic panel    Collection Time: 07/25/19  1:36 PM   Result Value Ref Range    Sodium 136 136 - 145 mmol/L    Potassium 2.9 (L) 3.5 - 5.1 mmol/L    Chloride 102 95 - 110 mmol/L    CO2 22 (L) 23 - 29 mmol/L    Glucose 121 (H) 70 - 110 mg/dL    BUN, Bld 12 6 - 20 mg/dL    Creatinine 1.1 0.5 - 1.4 mg/dL    Calcium 9.4 8.7 - 10.5 mg/dL    Total Protein 8.4 6.0 - 8.4 g/dL    Albumin 3.6 3.5 - 5.2 g/dL    Total Bilirubin 0.4 0.1 - 1.0 mg/dL    Alkaline Phosphatase 51 (L) 55 - 135 U/L    AST 20 10 - 40 U/L    ALT 12 10 - 44 U/L    Anion Gap 12 8 - 16 mmol/L    eGFR if African American >60 >60 mL/min/1.73 m^2    eGFR if non African American >60 >60 mL/min/1.73 m^2   Lactic acid, plasma #1    Collection Time: 07/25/19  1:36 PM   Result Value Ref Range    Lactate (Lactic Acid) 1.8 0.5 - 2.2 mmol/L   POCT urine pregnancy    Collection Time: 07/25/19  1:39 PM   Result Value Ref Range    POC Preg Test, Ur Negative Negative     Acceptable Yes    POCT  Influenza A/B    Collection Time: 07/25/19  2:03 PM   Result Value Ref Range    Rapid Influenza A Ag Negative Negative    Rapid Influenza B Ag Negative Negative     Acceptable Yes    Urinalysis, Reflex to Urine Culture Urine, Clean Catch    Collection Time: 07/25/19  2:35 PM   Result Value Ref Range    Specimen UA Urine, Clean Catch     Color, UA Britney Yellow, Straw, Britney    Appearance, UA Cloudy (A) Clear    pH, UA 5.0 5.0 - 8.0    Specific Gravity, UA >1.030 (A) 1.005 - 1.030    Protein, UA 2+ (A) Negative    Glucose, UA Negative Negative    Ketones, UA 1+ (A) Negative    Bilirubin (UA) 1+ (A) Negative    Occult Blood UA 3+ (A) Negative    Nitrite, UA Negative Negative    Urobilinogen, UA Negative <2.0 EU/dL    Leukocytes, UA Negative Negative   Urinalysis Microscopic    Collection Time: 07/25/19  2:35 PM   Result Value Ref Range    RBC, UA 50 (H) 0 - 4 /hpf    WBC, UA 2 0 - 5 /hpf    Bacteria None None-Occ /hpf    Squam Epithel, UA Many /hpf    Hyaline Casts, UA 0 0-1/lpf /lpf    Microscopic Comment SEE COMMENT    Lactic acid, plasma #2    Collection Time: 07/25/19  6:47 PM   Result Value Ref Range    Lactate (Lactic Acid) 3.6 (HH) 0.5 - 2.2 mmol/L       No results found for: INR, PROTIME  No results found for: HGBA1C  No results for input(s): POCTGLUCOSE in the last 72 hours.        MICROBIOLOGY DATA:     Urine Culture, Routine   Date Value Ref Range Status   05/25/2019 No significant growth  Final   01/27/2016   Final    STREPTOCOCCUS AGALACTIAE (GROUP B)  50,000 - 99,999 cfu/ml  No further workup.         Microbiology x 7d:   Microbiology Results (last 7 days)     Procedure Component Value Units Date/Time    E. coli 0157 antigen [[id number]] Collected:  07/25/19 2035    Order Status:  No result Specimen:  Stool Updated:  07/25/19 2036    Stool culture **CANNOT BE ORDERED STAT** [[id number]] Collected:  07/25/19 2035    Order Status:  Sent Specimen:  Stool Updated:  07/25/19 2036    Blood culture  x two cultures. Draw prior to antibiotics. [[id number]] Collected:  07/25/19 1425    Order Status:  Sent Specimen:  Blood from Peripheral, Hand, Left Updated:  07/25/19 1441    Blood culture x two cultures. Draw prior to antibiotics. [[id number]] Collected:  07/25/19 1430    Order Status:  Sent Specimen:  Blood from Peripheral, Hand, Right Updated:  07/25/19 1441            IMAGING:     Imaging Results          CT Abdomen Pelvis With Contrast (Final result)  Result time 07/25/19 16:20:19    Final result by Bart Rhodes MD (07/25/19 16:20:19)                 Impression:      Marked wall thickening of the terminal ileum, right-sided colon, and transverse colon with lesser degree of wall thickening involving the remainder of the large bowel.  The findings are consistent with enterocolitis.  No evidence of bowel obstruction.    Borderline splenomegaly.      Electronically signed by: Bart Rhodes MD  Date:    07/25/2019  Time:    16:20             Narrative:    EXAMINATION:  CT ABDOMEN PELVIS WITH CONTRAST    CLINICAL HISTORY:  Infection, abdomen-pelvis;    TECHNIQUE:  Low dose axial images, sagittal and coronal reformations were obtained from the lung bases to the pubic symphysis following the IV administration of 100 mL of Omnipaque 350 .  Oral contrast was not given.    COMPARISON:  Ultrasound of the abdomen dated 02/02/2019.    FINDINGS:  There are no pleural effusions.  There is no evidence of a pneumothorax.  No airspace opacity is present.  No discrete pulmonary nodule is identified.    The heart is unremarkable.  There is normal tapering of the abdominal aorta.  The aortic branch vessels are within normal limits.  The portal veins and mesenteric vessels are unremarkable.  The IVC and the remainder of the venous structures are unremarkable.  There is no evidence of lymphadenopathy in the abdomen or pelvis.    The esophagus, stomach, and duodenum are within normal limits.  There is fluid distention and wall  thickening of the small bowel loops.  There is marked wall thickening involving the terminal ileum.  The visualized portions of the appendix appear grossly unremarkable.  There is wall thickening of the entire large bowel, predominantly the right-sided colon and the transverse colon.  There is also fluid distention of the large bowel loops.    The liver is borderline enlarged.  The gallbladder is within normal limits.  The biliary tree is unremarkable.  The spleen is enlarged.  The pancreas is within normal limits.    The adrenal glands are unremarkable.  The kidneys are within normal limits.  The ureters are unremarkable.  There is wall thickening of the urinary bladder.  The uterus and adnexal structures are within normal limits.    There is trace amount of free fluid the pelvis.  There is no evidence of free air there is no evidence of pneumatosis.  No portal venous air is identified.    The psoas margins are unremarkable.  The abdominal wall is within normal limits.  The osseous structures are unremarkable.                               X-Ray Chest AP Portable (Final result)  Result time 07/25/19 13:38:48    Final result by Jl Gunter MD (07/25/19 13:38:48)                 Impression:      No evidence of active cardiopulmonary disease.      Electronically signed by: Jl Gunter MD  Date:    07/25/2019  Time:    13:38             Narrative:    EXAMINATION:  XR CHEST AP PORTABLE    CLINICAL HISTORY:  Sepsis;    TECHNIQUE:  Frontal view of the chest was performed.    COMPARISON:  11/19/2018    FINDINGS:  The cardiomediastinal silhouette is normal in size and midline. Pulmonary vascularity appears within normal limits.    The lungs appear clear without confluent pulmonary parenchymal opacity. No pleural fluid or pneumothorax.                                  CONSULTS:     PHARMACY TO DOSE VANCOMYCIN CONSULT  IP CONSULT TO GI       ASSESSMENT & PLAN:     Primary Diagnosis:  Sepsis    Active Hospital  Problems    Diagnosis  POA    *Sepsis [A41.9]  Yes     Priority: 1 - High    Enterocolitis [K52.9]  Yes     Priority: 2     Hypokalemia [E87.6]  Yes    Elevated lactic acid level [R79.89]  Yes      Resolved Hospital Problems   No resolved problems to display.       Sepsis secondary to enterocolitis  · As evidence by history, physical exam, and CT imaging  · Likely  bacterial .    · No recent use of antibiotics.  Unclear etiology.  · No evidence of bowel obstruction  · IV fluids  · Cultures obtained and intravenous antibiotics initiated, including ciprofloxacin and metronidazole  · Bowel rest  · GI consult for further evaluation and recommendations    Hypokalemia  · Due to GI losses or poor oral intake  · Check magnesium  · Replace potassium orally if possible, if not then IV  · Monitor with serial labs          VTE Risk Mitigation (From admission, onward)        Ordered     IP VTE HIGH RISK PATIENT  Once      07/25/19 2012     Place sequential compression device  Until discontinued      07/25/19 2012            Adult PRN medications available   DVT prophylaxis given       DISPOSITION:     Will admit to the Hospital Medicine service for further evaluation and treatment.    Chart reviewed and updated where applicable.    High Risk Conditions:  Patient has a condition that poses threat to life and bodily function: sepsis      ===============================================================    Won Maria MD, MPH  Department of Hospital Medicine   [facility]  [phone]  (7pm - 6am)          This note is dictated using [employer] voice recognition software.  There are word recognition mistakes that are occasionally missed on review.

## 2019-07-26 NOTE — NURSING
Patient's VS were updated from arrival to floor. Patient's current temp is 100. Ordered antibiotics were initiated and IVFs. Please continue to monitor.

## 2019-07-26 NOTE — PROGRESS NOTES
"Pt states cramps are "coming and going" but are a 10/10. Bentyl and dilaudid is not helping. Dr Tapia notified. Percocet order received.  "

## 2019-07-26 NOTE — NURSING
Patient's current MEWS Score is a 7. Patient new admit from ER. Charge nurse noted MD abrams. Rec'd new order for Tylenol. Please administer meds to decrease temp, and perform other nursing interventions to help cool patient. Will continue to monitor.

## 2019-07-26 NOTE — PROGRESS NOTES
[facility] Hospital Medicine  Progress Note    Patient Name: Aziza Carter  MRN: [id number]  Patient Class: IP- Inpatient   Admission Date: 2019  Length of Stay: 1 days  Attending Physician: Kadie Hector MD  Primary Care Provider: Lore Rodriguez NP        Subjective:     Principal Problem:Severe sepsis      HPI:  Aziza Carter is a 24 y.o. female that (in part)  has no past medical history on file.  has a past surgical history that includes Induced  (2019). Presents to [facility] Emergency Department complaining of a fever.  Associated with nausea, vomiting, diarrhea, and periumbilical abdominal pain. Subacute onset with progressive worsening.  Moderate to severe intensity.  Radiation throughout abdomen to back.  Daily frequency.  Constant duration.  Patient reports having a portion 2 months ago.  Denies sick contacts or recent travel.  Does not think she contaminated food.  No hematemesis, melena, dyspepsia, or previous gastrointestinal problems to report.    In the emergency department routine laboratory studies, urinalysis, and CT of the abdomen pelvis obtained.  There is evidence enterocolitis.  Patient showing evidence of sepsis with tachycardia, tachypnea, and fever in the ED.  She also had hypokalemia.  Cultures were obtained and she was given broad-spectrum antibiotics, IV fluids, and potassium replacement.  Sepsis protocol initiated.  GI is consulted.    Hospital medicine has been asked to admit for further evaluation and treatment.     Overview/Hospital Course:  Aziza Carter is a 24 y.o. female that (in part)  has no past medical history on file.  has a past surgical history that includes Induced  (2019). Presents to [facility] Emergency Department complaining of a fever.  Associated with nausea, vomiting, diarrhea, and periumbilical abdominal pain. Subacute onset with progressive worsening.   Moderate to severe intensity.  Radiation throughout abdomen to back.  Daily frequency.  Constant duration.  Patient reports having a portion 2 months ago.  Denies sick contacts or recent travel.  Does not think she contaminated food.  No hematemesis, melena, dyspepsia, or previous gastrointestinal problems to report.    In the emergency department routine laboratory studies, urinalysis, and CT of the abdomen pelvis obtained.  There is evidence enterocolitis.  Patient showing evidence of severe sepsis with tachycardia, tachypnea, elevated lactic acid,and fever in the ED.  She also had hypokalemia.  Cultures were obtained and she was given broad-spectrum antibiotics, IV fluids, and potassium replacement.  Sepsis protocol initiated.  GI is consulted.  her fever is currenty improved,consulted OBGYN also  for evaluation duo to recent  with D&C.        Interval History: fever is improving.    Review of Systems   Constitutional: Positive for activity change and appetite change.   HENT: Negative for congestion.    Eyes: Negative for discharge and itching.   Respiratory: Negative for apnea and chest tightness.    Cardiovascular: Negative for leg swelling.   Gastrointestinal: Positive for abdominal pain.   Genitourinary: Negative for difficulty urinating.   Musculoskeletal: Negative for arthralgias and back pain.     Objective:     Vital Signs (Most Recent):  Temp: 98.2 °F (36.8 °C) (19)  Pulse: (!) 122 (19)  Resp: 18 (19)  BP: 119/63 (19)  SpO2: 100 % (19) Vital Signs (24h Range):  Temp:  [98.2 °F (36.8 °C)-103.1 °F (39.5 °C)] 98.2 °F (36.8 °C)  Pulse:  [] 122  Resp:  [17-24] 18  SpO2:  [96 %-100 %] 100 %  BP: (110-130)/(53-78) 119/63     Weight: 88.5 kg (195 lb)  Body mass index is 32.45 kg/m².    Intake/Output Summary (Last 24 hours) at 2019 0828  Last data filed at 2019 0615  Gross per 24 hour   Intake 5691 ml   Output --   Net 5691 ml       Physical Exam   Constitutional: She is oriented to person, place, and time. She appears well-nourished. No distress.   Eyes: EOM are normal.   Neck: Neck supple.   Cardiovascular: Normal rate and regular rhythm.   Pulmonary/Chest: Effort normal and breath sounds normal.   Abdominal: Soft. There is tenderness.   Musculoskeletal: She exhibits no edema or deformity.   Neurological: She is oriented to person, place, and time. She displays normal reflexes.       Significant Labs:   CBC:   Recent Labs   Lab 19  1336 19  0530   WBC 6.61 5.95   HGB 9.0* 7.9*   HCT 30.8* 26.8*    143*     CMP:   Recent Labs   Lab 19  1336 19  0530    136   K 2.9* 2.8*    108   CO2 22* 21*   * 111*   BUN 12 6   CREATININE 1.1 0.8   CALCIUM 9.4 8.0*   PROT 8.4  --    ALBUMIN 3.6  --    BILITOT 0.4  --    ALKPHOS 51*  --    AST 20  --    ALT 12  --    ANIONGAP 12 7*   EGFRNONAA >60 >60       Significant Imaging: reviewed.      Assessment/Plan:      * Severe sepsis  With fever,tachycardia,tachypnea,source enterocolitis,at this  Time,on IVF and IV Abbx as above,will repeat lactic acid.      Elevated lactic acid level  Duo to sever sepsis,on IVF,will repeat.      Hypokalemia  repalced.      Enterocolitis  Per CT and clinical presentation,on IVF and  IV Abx with Cipro and flagyl,her fever is current;y improved,consulted OBGYN also  for evaluation duo to recent  with D&C.        VTE Risk Mitigation (From admission, onward)        Ordered     IP VTE HIGH RISK PATIENT  Once      19     Place sequential compression device  Until discontinued      19                Kadie Hector MD  Department of Hospital Medicine   [facility]

## 2019-07-26 NOTE — ASSESSMENT & PLAN NOTE
Per CT and clinical presentation,on IVF and  IV Abx with Cipro and flagyl,her fever is current;y improved,consulted OBGYN also  for evaluation duo to recent  with D&C.

## 2019-07-26 NOTE — PLAN OF CARE
Problem: Adult Inpatient Plan of Care  Goal: Plan of Care Review  Outcome: Ongoing (interventions implemented as appropriate)  AO x4, no falls or injury. Multiple watery BM, pt describes as yellow to green in color. Contact isolation initiated. Stool collection cup placed at bedside, instructed to call nurse asap after producing BM. Medicated for severe abdomen with percocet. Tolerating regular diet. Received Iv antibiotics and fluids. Tachycardic with fever.

## 2019-07-26 NOTE — SUBJECTIVE & OBJECTIVE
Interval History: fever is improving.    Review of Systems   Constitutional: Positive for activity change and appetite change.   HENT: Negative for congestion.    Eyes: Negative for discharge and itching.   Respiratory: Negative for apnea and chest tightness.    Cardiovascular: Negative for leg swelling.   Gastrointestinal: Positive for abdominal pain.   Genitourinary: Negative for difficulty urinating.   Musculoskeletal: Negative for arthralgias and back pain.     Objective:     Vital Signs (Most Recent):  Temp: 98.2 °F (36.8 °C) (07/26/19 0817)  Pulse: (!) 122 (07/26/19 0817)  Resp: 18 (07/26/19 0817)  BP: 119/63 (07/26/19 0817)  SpO2: 100 % (07/26/19 0817) Vital Signs (24h Range):  Temp:  [98.2 °F (36.8 °C)-103.1 °F (39.5 °C)] 98.2 °F (36.8 °C)  Pulse:  [] 122  Resp:  [17-24] 18  SpO2:  [96 %-100 %] 100 %  BP: (110-130)/(53-78) 119/63     Weight: 88.5 kg (195 lb)  Body mass index is 32.45 kg/m².    Intake/Output Summary (Last 24 hours) at 7/26/2019 0828  Last data filed at 7/26/2019 0615  Gross per 24 hour   Intake 5691 ml   Output --   Net 5691 ml      Physical Exam   Constitutional: She is oriented to person, place, and time. She appears well-nourished. No distress.   Eyes: EOM are normal.   Neck: Neck supple.   Cardiovascular: Normal rate and regular rhythm.   Pulmonary/Chest: Effort normal and breath sounds normal.   Abdominal: Soft. There is tenderness.   Musculoskeletal: She exhibits no edema or deformity.   Neurological: She is oriented to person, place, and time. She displays normal reflexes.       Significant Labs:   CBC:   Recent Labs   Lab 07/25/19 1336 07/26/19  0530   WBC 6.61 5.95   HGB 9.0* 7.9*   HCT 30.8* 26.8*    143*     CMP:   Recent Labs   Lab 07/25/19 1336 07/26/19  0530    136   K 2.9* 2.8*    108   CO2 22* 21*   * 111*   BUN 12 6   CREATININE 1.1 0.8   CALCIUM 9.4 8.0*   PROT 8.4  --    ALBUMIN 3.6  --    BILITOT 0.4  --    ALKPHOS 51*  --    AST 20  --     ALT 12  --    ANIONGAP 12 7*   EGFRNONAA >60 >60       Significant Imaging: reviewed.

## 2019-07-26 NOTE — ASSESSMENT & PLAN NOTE
With fever,tachycardia,tachypnea,source enterocolitis,at this  Time,on IVF and IV Abbx as above,will repeat lactic acid.

## 2019-07-26 NOTE — PROGRESS NOTES
Patient new admit to the unit: Mews score 7  MD notified and to the patient bedside  MD examined patient; and gave new orders    Stat EKG  Bolus 1 liter normal saline   Tylenol

## 2019-07-26 NOTE — CONSULTS
[facility]  Obstetrics & Gynecology  Consult Note    Patient Name: Aziza Carter  MRN: [id number]  Admission Date: 2019  Hospital Length of Stay: 1 days  Code Status: Full Code  Primary Care Provider: Lore Rodriguez NP  Principal Problem: Severe sepsis    Consults  Subjective:     Chief Complaint: pt reports feeling improved since admit. Pt denies vaginal bleeding or discharge. Pt is s/p elective termination of pregnancy last month via suction dilation and curettage. Pt denies complications.      No current facility-administered medications on file prior to encounter.      Current Outpatient Medications on File Prior to Encounter   Medication Sig    benzonatate (TESSALON) 100 MG capsule Take 1 capsule (100 mg total) by mouth 3 (three) times daily as needed for Cough.    cetirizine (ZYRTEC) 10 MG tablet Take 1 tablet (10 mg total) by mouth once daily.    famotidine (PEPCID) 20 MG tablet Take 1 tablet (20 mg total) by mouth 2 (two) times daily before meals. for 30 days    fluticasone (FLONASE) 50 mcg/actuation nasal spray 1 spray by Each Nare route 2 (two) times daily as needed for Rhinitis.    ondansetron (ZOFRAN) 4 MG tablet Take 1 tablet (4 mg total) by mouth every 8 (eight) hours as needed for Nausea.    pantoprazole (PROTONIX) 20 MG tablet Take 1 tablet (20 mg total) by mouth once daily.       Review of patient's allergies indicates:  No Known Allergies    History reviewed. No pertinent past medical history.  OB History    Para Term  AB Living   0 0 0 0 0 0   SAB TAB Ectopic Multiple Live Births   0 0 0 0 0     Past Surgical History:   Procedure Laterality Date    INDUCED   2019     Family History     Problem Relation (Age of Onset)    Hypertension Maternal Grandfather, Father    No Known Problems Mother        Tobacco Use    Smoking status: Never Smoker    Smokeless tobacco: Never Used   Substance and Sexual Activity    Alcohol use: Yes      Alcohol/week: 0.0 oz     Comment: occasionally    Drug use: Yes     Types: Marijuana    Sexual activity: Not Currently     Partners: Male     Review of Systems  Objective:     Vital Signs (Most Recent):  Temp: (!) 101.2 °F (38.4 °C) (07/26/19 1151)  Pulse: (!) 112 (07/26/19 1151)  Resp: 18 (07/26/19 1151)  BP: (!) 122/57 (07/26/19 1151)  SpO2: 100 % (07/26/19 1151) Vital Signs (24h Range):  Temp:  [98.2 °F (36.8 °C)-103.1 °F (39.5 °C)] 101.2 °F (38.4 °C)  Pulse:  [] 112  Resp:  [17-24] 18  SpO2:  [96 %-100 %] 100 %  BP: (110-130)/(53-78) 122/57     Weight: 88.5 kg (195 lb)  Body mass index is 32.45 kg/m².  Patient's last menstrual period was 07/22/2019 (approximate).    Physical Exam:   Constitutional: She is oriented to person, place, and time. She appears well-developed and well-nourished.    HENT:   Head: Normocephalic and atraumatic.      Cardiovascular: Normal rate and regular rhythm.     Pulmonary/Chest: Effort normal and breath sounds normal.        Abdominal: Soft. Bowel sounds are normal.     Genitourinary: Vagina normal and uterus normal.           Musculoskeletal: Normal range of motion and moves all extremeties.       Neurological: She is alert and oriented to person, place, and time. She has normal reflexes.    Skin: Skin is warm and dry.        Laboratory:  ABGs: No results for input(s): PH, PCO2, PO2, HCO3, POCSATURATED, BE in the last 48 hours.  Beta HCG: No results for input(s): HCGPREGUR in the last 48 hours.  BMP:   Recent Labs   Lab 07/26/19  0530   *      K 2.8*      CO2 21*   BUN 6   CREATININE 0.8   CALCIUM 8.0*   MG 1.6     Cardiac Markers: No results for input(s): CKMB, CPKMB, TROPONINT, TROPONINI, MYOGLOBIN in the last 48 hours.  CBC:   Recent Labs   Lab 07/26/19  0530   WBC 5.95   RBC 4.01   HGB 7.9*   HCT 26.8*   *   MCV 67*   MCH 19.7*   MCHC 29.5*     Chlamydia: No results for input(s): CHLAMYDIA in the last 48 hours.  CMP:   Recent Labs   Lab  07/25/19  1336 07/26/19  0530   * 111*   CALCIUM 9.4 8.0*   ALBUMIN 3.6  --    PROT 8.4  --     136   K 2.9* 2.8*   CO2 22* 21*    108   BUN 12 6   CREATININE 1.1 0.8   ALKPHOS 51*  --    ALT 12  --    AST 20  --    BILITOT 0.4  --      Coagulation: No results for input(s): PT, INR, APTT in the last 48 hours.  Gonorrhea: Invalid input(s): GONORRHEA  LFTs:   Recent Labs   Lab 07/25/19  1336   ALT 12   AST 20   ALKPHOS 51*   BILITOT 0.4   PROT 8.4   ALBUMIN 3.6     Urine Pregnancy Test: No results for input(s): PREGTESTUR in the last 48 hours.  Recent Labs   Lab 07/25/19  1435   COLORU Britney   SPECGRAV >1.030*   PHUR 5.0   PROTEINUA 2+*   BACTERIA None   NITRITE Negative   LEUKOCYTESUR Negative   UROBILINOGEN Negative   HYALINECASTS 0     Recent Lab Results       07/26/19  1031   07/26/19  0530   07/25/19  2035   07/25/19  1847   07/25/19  1435        Procalcitonin               Albumin               Alkaline Phosphatase               ALT               Anion Gap   7           Aniso   Slight  Comment:  Microcytosis           Appearance, UA         Cloudy     AST               Bacteria, UA         None     Baso #   0.01           Basophil%   0.2           Bilirubin (UA)         1+  Comment:  Positive urine bilirubin is not confirmed. Correlate with   serum bilirubin and clinical presentation.       BILIRUBIN TOTAL               Blood Culture, Routine               BUN, Bld   6           Calcium   8.0           Chloride   108           CO2   21           Color, UA         Britney     Creatinine   0.8           Differential Method   Automated           eGFR if    >60           eGFR if non    >60  Comment:  Calculation used to obtain the estimated glomerular filtration  rate (eGFR) is the CKD-EPI equation.              Eos #   0.0           Eosinophil%   0.2           Free T4               Large/Giant Platelets   Present           Glucose   111           Glucose, UA          Negative     Gran # (ANC)   4.9           Gran%   82.0           Hematocrit   26.8           Hemoglobin   7.9           Hyaline Casts, UA         0     Hypo   Occasional           Ketones, UA         1+     Lactate, Edvin 2.3  Comment:  Falsely low lactic acid results can be found in samples   containing >=13.0 mg/dL total bilirubin and/or >=3.5 mg/dL   direct bilirubin.       3.6  Comment:  Falsely low lactic acid results can be found in samples   containing >=13.0 mg/dL total bilirubin and/or >=3.5 mg/dL   direct bilirubin.  Lactic Acid critical result(s) called and verbal readback obtained   from Dr. Wilder., 07/25/2019 19:23         Leukocytes, UA         Negative     Lymph #   0.7           Lymph%   12.1           Magnesium   1.6           MCH   19.7           MCHC   29.5           MCV   67           Microscopic Comment         SEE COMMENT  Comment:  Other formed elements not mentioned in the report are not   present in the microscopic examination.        Mono #   0.3           Mono%   5.7           MPV   SEE COMMENT  Comment:  Result not available.           NITRITE UA         Negative     Occult Blood UA         3+     Ovalocytes   Occasional           pH, UA         5.0     Phosphorus   2.9           Platelet Estimate   Appears normal           Platelets   143           Poik               Potassium   2.8           Preg Test, Ur               PROTEIN TOTAL               Protein, UA         2+  Comment:  Recommend a 24 hour urine protein or a urine   protein/creatinine ratio if globulin induced proteinuria is  clinically suspected.        Acceptable               Rapid Influenza A Ag               Rapid Influenza B Ag               RBC   4.01           RBC, UA         50     RDW   18.5           Sodium   136           Specific Gravity, UA         >1.030     Specimen UA         Urine, Clean Catch     Squam Epithel, UA         Many     Starch Stain, Stool     No growth[P]         T3, Free                TSH               UROBILINOGEN UA         Negative     WBC, UA         2     WBC   5.95                            07/25/19  1430   07/25/19  1425   07/25/19  1403   07/25/19  1339   07/25/19  1336        Procalcitonin         0.41  Comment:  A concentration < 0.25 ng/mL represents a low risk bacterial   infection.  Procalcitonin may not be accurate among patients with localized   infection, recent trauma or major surgery, immunosuppressed state,   invasive fungal infection, renal dysfunction. Decisions regarding   initiation or continuation of antibiotic therapy should not be based   solely on procalcitonin levels.       Albumin         3.6     Alkaline Phosphatase         51     ALT         12     Anion Gap         12     Aniso         Slight     Appearance, UA               AST         20     Bacteria, UA               Baso #         0.01     Basophil%         0.2     Bilirubin (UA)               BILIRUBIN TOTAL         0.4  Comment:  For infants and newborns, interpretation of results should be based  on gestational age, weight and in agreement with clinical  observations.  Premature Infant recommended reference ranges:  Up to 24 hours.............<8.0 mg/dL  Up to 48 hours............<12.0 mg/dL  3-5 days..................<15.0 mg/dL  6-29 days.................<15.0 mg/dL       Blood Culture, Routine No Growth to date[P] No Growth to date[P]           BUN, Bld         12     Calcium         9.4     Chloride         102     CO2         22     Color, UA               Creatinine         1.1     Differential Method         Automated     eGFR if          >60     eGFR if non          >60  Comment:  Calculation used to obtain the estimated glomerular filtration  rate (eGFR) is the CKD-EPI equation.        Eos #         0.0     Eosinophil%         0.0     Free T4         0.95     Large/Giant Platelets               Glucose         121     Glucose, UA               Gran # (ANC)          5.7     Gran%         86.2     Hematocrit         30.8     Hemoglobin         9.0     Hyaline Casts, UA               Hypo         Occasional     Ketones, UA               Lactate, Edvin         1.8  Comment:  Falsely low lactic acid results can be found in samples   containing >=13.0 mg/dL total bilirubin and/or >=3.5 mg/dL   direct bilirubin.       Leukocytes, UA               Lymph #         0.6     Lymph%         8.5     Magnesium               MCH         19.7     MCHC         29.2     MCV         67     Microscopic Comment               Mono #         0.4     Mono%         5.3     MPV         SEE COMMENT  Comment:  Result not available.     NITRITE UA               Occult Blood UA               Ovalocytes               pH, UA               Phosphorus               Platelet Estimate               Platelets         199     Poik         Slight     Potassium         2.9     Preg Test, Ur       Negative       PROTEIN TOTAL         8.4     Protein, UA                Acceptable     Yes Yes       Rapid Influenza A Ag     Negative         Rapid Influenza B Ag     Negative         RBC         4.58     RBC, UA               RDW         18.3     Sodium         136     Specific [place], UA               Specimen UA               Squam Epithel, UA               Starch Stain, Stool               T3, Free         1.9     TSH         0.529     UROBILINOGEN UA               WBC, UA               WBC         6.61                        I have personallly reviewed all pertinent lab results from the last 24 hours.    Diagnostic Results:  X-Ray: Reviewed  CT: Reviewed    Assessment/Plan:     Active Diagnoses:    Diagnosis Date Noted POA    PRINCIPAL PROBLEM:  Severe sepsis [A41.9, R65.20] 07/25/2019 Yes    Enterocolitis [K52.9] 07/25/2019 Yes    Hypokalemia [E87.6] 07/25/2019 Yes    Elevated lactic acid level [R79.89] 07/25/2019 Yes      Problems Resolved During this Admission:       Do not suspect ob/gyn origin  as ct of pelvis wnl and upt negative.    Thank you for your consult. I will sign off. Please contact us if you have any additional questions.    Liz Huber MD  Obstetrics & Gynecology  [facility]

## 2019-07-26 NOTE — CONSULTS
.[facility]  Gastroenterology  Consult Note    Patient Name: Azzia Carter  MRN: [id number]  Admission Date: 7/25/2019  Hospital Length of Stay: 1 days  Code Status: Full Code   Primary Care Physician: Lore Rodriguez NP  Principal Problem:Severe sepsis    Consults  Subjective:     Chief complaint: Abdominal pain, vomiting, diarrhea.    HPI: The patient is a 24 year old female with no medical history presenting with abdominal pain, nausea, vomiting and diarrhea.  She reports acute onset of intermittent lower abdominal cramping associated with nausea, vomiting and non-bloody diarrhea.  Symptoms began 2-3 days ago.  No clear precipitants - denies recent sick contacts, travel or antibiotic use.  No obvious aggravating or alleviating factors.  Also reports fever of 101 at home, as well as chills.  At baseline, she suffers with constipation.  Her brother has Crohn's disease and recently underwent colonic resection.  No other family history of IBD.    Past medical history:  Denies.    Past surgical history:  Denies.    Social history:  Tobacco use: denies.  Alcohol use: denies.  Illicit drug use: denies.    Family history:  Brother has Crohn's disease.  No history of colon cancer.    Medications:  Medications Prior to Admission   Medication Sig Dispense Refill Last Dose    benzonatate (TESSALON) 100 MG capsule Take 1 capsule (100 mg total) by mouth 3 (three) times daily as needed for Cough. 15 capsule 0     cetirizine (ZYRTEC) 10 MG tablet Take 1 tablet (10 mg total) by mouth once daily. 30 tablet 0 Unknown    famotidine (PEPCID) 20 MG tablet Take 1 tablet (20 mg total) by mouth 2 (two) times daily before meals. for 30 days 60 tablet 0 Unknown    fluticasone (FLONASE) 50 mcg/actuation nasal spray 1 spray by Each Nare route 2 (two) times daily as needed for Rhinitis. 15 g 0 Unknown    ondansetron (ZOFRAN) 4 MG tablet Take 1 tablet (4 mg total) by mouth every 8 (eight) hours as needed for Nausea. 12  tablet 0     pantoprazole (PROTONIX) 20 MG tablet Take 1 tablet (20 mg total) by mouth once daily. 30 tablet 0        Allergies:  No known drug allergies.    Review of systems:  CONSTITUTIONAL: Positive for fever and chills. No weight loss.  HEENT: Negative for blurred vision, hearing loss, nasal congestion, dry mouth, sore throat.  CARDIOVASCULAR: Negative for chest pain or palpitations.  RESPIRATORY: Negative for SOB or cough.  GASTROINTESTINAL: See HPI  GENITOURINARY: Negative for dysuria or hematuria.  MUSCULOSKELETAL: Negative for osteoarthritis or muscle pain.  SKIN: Negative for rashes/lesions.  NEUROLOGIC: Negative for headaches, numbness/tingling.  ENDOCRINE: Negative for diabetes or thyroid abnormalities.  HEMATOLOGIC: Negative for anemia or blood dyscrasias.    Objective:     Vital Signs (Most Recent):  Temp: (!) 101.2 °F (38.4 °C) (07/26/19 1151)  Pulse: (!) 118 (07/26/19 1238)  Resp: 18 (07/26/19 1151)  BP: (!) 122/57 (07/26/19 1151)  SpO2: 100 % (07/26/19 1238) Vital Signs (24h Range):  Temp:  [98.2 °F (36.8 °C)-103.1 °F (39.5 °C)] 101.2 °F (38.4 °C)  Pulse:  [] 118  Resp:  [17-24] 18  SpO2:  [96 %-100 %] 100 %  BP: (110-130)/(53-78) 122/57     Physical examination:  General: Well developed AAF in no acute distress.  HENT: NCAT, atraumatic, hearing grossly intact, no visible or palpable thyroid mass  Eyes: PERRL, EOMI, anicteric sclera  Cardiovascular: Regular rate and rhythm. No peripheral edema.   Lungs: Non-labored respirations. Breath sounds equal.   Abdomen: Soft. Nondistended. Minimal TTP to lower abdomen bilaterally. No guarding.  Extremities: No C/C, 2+ dorsalis pedis pulses bilaterally  Neuro: AA&O x 3, no asterixes or tremors  Psych: Appropriate mood and affect. No SI.  Skin: No jaundice, rashes or lesions  Musculoskeletal: 5/5 strength bilaterally    CBC:   Recent Labs   Lab 07/25/19  1336 07/26/19  0530   WBC 6.61 5.95   HGB 9.0* 7.9*   HCT 30.8* 26.8*    143*     CMP:    Recent Labs   Lab 07/25/19  1336 07/26/19  0530   * 111*   CALCIUM 9.4 8.0*   ALBUMIN 3.6  --    PROT 8.4  --     136   K 2.9* 2.8*   CO2 22* 21*    108   BUN 12 6   CREATININE 1.1 0.8   ALKPHOS 51*  --    ALT 12  --    AST 20  --    BILITOT 0.4  --        Imaging:  CT abd/pelvis (7/25/19):  Impression:  Marked wall thickening of the terminal ileum, right-sided colon, and transverse colon with lesser degree of wall thickening involving the remainder of the large bowel.  The findings are consistent with enterocolitis.  No evidence of bowel obstruction.    Borderline splenomegaly.    Assessment:   24 year old female with no medical history presenting with 2-3 days of abdominal pain, nausea, vomiting, diarrhea and fever.  Found to have enterocolitis on CT scan.  Unclear if infectious vs. underlying IBD, though persistent fever is more suggestive of infectious etiology.  Blood cultures negative.  Clinically doing better today but still with significant diarrhea.    Plan:   1.  Check stool for C-diff/cultures and fecal calprotectin.  2.  Will also check CRP/sed rate.  3.  Continue supportive measures -- IVF's, IV antibiotics, pain/nausea control.  4.  Will need colonoscopy in near future to rule out underlying IBD. If continues to improve, can likely be done as outpatient. If fever/diarrhea persist over the weekend, can be done inpatient on Monday.      Thank you for your consult.     Rut Dugan PA-C  Gastroenterology  [facility]

## 2019-07-26 NOTE — HOSPITAL COURSE
Aziza Carter is a 24 y.o. female that (in part)  has no past medical history on file.  has a past surgical history that includes Induced  (2019). Presents to [facility] Emergency Department complaining of a fever.  Associated with nausea, vomiting, diarrhea, and periumbilical abdominal pain. Subacute onset with progressive worsening.  Moderate to severe intensity.  Radiation throughout abdomen to back.  Daily frequency.  Constant duration.  Patient reports having  2 months ago.  Denies sick contacts or recent travel.  Does not think she contaminated food.  No hematemesis, melena, dyspepsia, or previous gastrointestinal problems to report.  In the emergency department routine laboratory studies, urinalysis, and CT of the abdomen pelvis obtained.  There is evidence enterocolitis.  Patient showing evidence of severe sepsis with tachycardia, tachypnea, elevated lactic acid,and fever in the ED.  She also had hypokalemia.  Cultures were obtained and she was started on broad-spectrum antibiotics, IV fluids, and potassium replacement.  Sepsis protocol initiated.  GI was consulted.  her fever is gradually improved,,consulted OBGYN for evaluation duo to recent  with D&C.they did not think infection GYN related.GI want continue with Abx and follow up as out patient with colonoscopy to R/O crohn's,her symptoms much improved,fever is resolved,.elvated lactic acid is improved,blood culture was negative,she was tolerating diet,patient has been discharged home with PO Abx and follow up with PCP an dGI in next 1-2 weeks.

## 2019-07-26 NOTE — HPI
Aziza Carter is a 24 y.o. female that (in part)  has no past medical history on file.  has a past surgical history that includes Induced  (2019). Presents to [facility] Emergency Department complaining of a fever.  Associated with nausea, vomiting, diarrhea, and periumbilical abdominal pain. Subacute onset with progressive worsening.  Moderate to severe intensity.  Radiation throughout abdomen to back.  Daily frequency.  Constant duration.  Patient reports having a portion 2 months ago.  Denies sick contacts or recent travel.  Does not think she contaminated food.  No hematemesis, melena, dyspepsia, or previous gastrointestinal problems to report.    In the emergency department routine laboratory studies, urinalysis, and CT of the abdomen pelvis obtained.  There is evidence enterocolitis.  Patient showing evidence of sepsis with tachycardia, tachypnea, and fever in the ED.  She also had hypokalemia.  Cultures were obtained and she was given broad-spectrum antibiotics, IV fluids, and potassium replacement.  Sepsis protocol initiated.  GI is consulted.    Hospital medicine has been asked to admit for further evaluation and treatment.

## 2019-07-27 VITALS
HEART RATE: 75 BPM | RESPIRATION RATE: 17 BRPM | TEMPERATURE: 99 F | SYSTOLIC BLOOD PRESSURE: 120 MMHG | WEIGHT: 199.31 LBS | BODY MASS INDEX: 33.21 KG/M2 | HEIGHT: 65 IN | DIASTOLIC BLOOD PRESSURE: 65 MMHG | OXYGEN SATURATION: 100 %

## 2019-07-27 LAB
ANION GAP SERPL CALC-SCNC: 6 MMOL/L (ref 8–16)
ANISOCYTOSIS BLD QL SMEAR: SLIGHT
BASOPHILS # BLD AUTO: 0 K/UL (ref 0–0.2)
BASOPHILS NFR BLD: 0 % (ref 0–1.9)
BUN SERPL-MCNC: 6 MG/DL (ref 6–20)
CALCIUM SERPL-MCNC: 8.3 MG/DL (ref 8.7–10.5)
CHLORIDE SERPL-SCNC: 112 MMOL/L (ref 95–110)
CO2 SERPL-SCNC: 20 MMOL/L (ref 23–29)
CREAT SERPL-MCNC: 0.7 MG/DL (ref 0.5–1.4)
DIFFERENTIAL METHOD: ABNORMAL
EOSINOPHIL # BLD AUTO: 0.1 K/UL (ref 0–0.5)
EOSINOPHIL NFR BLD: 2.2 % (ref 0–8)
ERYTHROCYTE [DISTWIDTH] IN BLOOD BY AUTOMATED COUNT: 18.8 % (ref 11.5–14.5)
EST. GFR  (AFRICAN AMERICAN): >60 ML/MIN/1.73 M^2
EST. GFR  (NON AFRICAN AMERICAN): >60 ML/MIN/1.73 M^2
GLUCOSE SERPL-MCNC: 101 MG/DL (ref 70–110)
HCT VFR BLD AUTO: 25.8 % (ref 37–48.5)
HGB BLD-MCNC: 7.5 G/DL (ref 12–16)
HYPOCHROMIA BLD QL SMEAR: ABNORMAL
LYMPHOCYTES # BLD AUTO: 1.1 K/UL (ref 1–4.8)
LYMPHOCYTES NFR BLD: 38.8 % (ref 18–48)
MCH RBC QN AUTO: 19.6 PG (ref 27–31)
MCHC RBC AUTO-ENTMCNC: 29.1 G/DL (ref 32–36)
MCV RBC AUTO: 67 FL (ref 82–98)
MONOCYTES # BLD AUTO: 0.3 K/UL (ref 0.3–1)
MONOCYTES NFR BLD: 10.9 % (ref 4–15)
NEUTROPHILS # BLD AUTO: 1.3 K/UL (ref 1.8–7.7)
NEUTROPHILS NFR BLD: 48.8 % (ref 38–73)
OVALOCYTES BLD QL SMEAR: ABNORMAL
PLATELET # BLD AUTO: 165 K/UL (ref 150–350)
PLATELET BLD QL SMEAR: ABNORMAL
PMV BLD AUTO: ABNORMAL FL (ref 9.2–12.9)
POIKILOCYTOSIS BLD QL SMEAR: SLIGHT
POTASSIUM SERPL-SCNC: 3 MMOL/L (ref 3.5–5.1)
RBC # BLD AUTO: 3.83 M/UL (ref 4–5.4)
SODIUM SERPL-SCNC: 138 MMOL/L (ref 136–145)
WBC # BLD AUTO: 2.76 K/UL (ref 3.9–12.7)

## 2019-07-27 PROCEDURE — 36415 COLL VENOUS BLD VENIPUNCTURE: CPT

## 2019-07-27 PROCEDURE — S0030 INJECTION, METRONIDAZOLE: HCPCS | Performed by: HOSPITALIST

## 2019-07-27 PROCEDURE — 85025 COMPLETE CBC W/AUTO DIFF WBC: CPT

## 2019-07-27 PROCEDURE — 63600175 PHARM REV CODE 636 W HCPCS: Performed by: HOSPITALIST

## 2019-07-27 PROCEDURE — 25000003 PHARM REV CODE 250: Performed by: HOSPITALIST

## 2019-07-27 PROCEDURE — 80048 BASIC METABOLIC PNL TOTAL CA: CPT

## 2019-07-27 RX ORDER — METRONIDAZOLE 500 MG/1
500 TABLET ORAL EVERY 12 HOURS
Qty: 20 TABLET | Refills: 0 | Status: SHIPPED | OUTPATIENT
Start: 2019-07-27 | End: 2019-08-06

## 2019-07-27 RX ORDER — CIPROFLOXACIN 500 MG/1
500 TABLET ORAL EVERY 12 HOURS
Qty: 20 TABLET | Refills: 0 | Status: SHIPPED | OUTPATIENT
Start: 2019-07-27 | End: 2019-08-06

## 2019-07-27 RX ORDER — LOPERAMIDE HYDROCHLORIDE 2 MG/1
2 CAPSULE ORAL ONCE
Status: DISCONTINUED | OUTPATIENT
Start: 2019-07-27 | End: 2019-07-27

## 2019-07-27 RX ORDER — POTASSIUM CHLORIDE 20 MEQ/15ML
60 SOLUTION ORAL
Status: COMPLETED | OUTPATIENT
Start: 2019-07-27 | End: 2019-07-27

## 2019-07-27 RX ORDER — LOPERAMIDE HYDROCHLORIDE 2 MG/1
2 CAPSULE ORAL ONCE
Status: COMPLETED | OUTPATIENT
Start: 2019-07-27 | End: 2019-07-27

## 2019-07-27 RX ADMIN — POTASSIUM CHLORIDE 60 MEQ: 20 SOLUTION ORAL at 10:07

## 2019-07-27 RX ADMIN — DICYCLOMINE HYDROCHLORIDE 20 MG: 10 CAPSULE ORAL at 06:07

## 2019-07-27 RX ADMIN — METRONIDAZOLE 500 MG: 500 INJECTION, SOLUTION INTRAVENOUS at 04:07

## 2019-07-27 RX ADMIN — LOPERAMIDE HYDROCHLORIDE 2 MG: 2 CAPSULE ORAL at 10:07

## 2019-07-27 RX ADMIN — CIPROFLOXACIN 400 MG: 2 INJECTION, SOLUTION INTRAVENOUS at 10:07

## 2019-07-27 NOTE — PROGRESS NOTES
Chief Complaint / Reason for Consult:  Abdominal pain, vomiting, diarrhea    Patient reports her symptoms have overall improved    ROS:  No CP, SOB, +fever yesterday midday    Patient Vitals for the past 24 hrs:   BP Temp Temp src Pulse Resp SpO2 Weight   07/27/19 0804 115/65 98.7 °F (37.1 °C) Oral 74 17 100 % --   07/27/19 0441 120/70 98.2 °F (36.8 °C) Oral 84 18 99 % 90.4 kg (199 lb 4.7 oz)   07/26/19 2346 (!) 124/58 98.1 °F (36.7 °C) Oral 85 18 99 % --   07/26/19 2100 -- -- -- -- -- 99 % --   07/26/19 2034 (!) 133/59 98.5 °F (36.9 °C) Oral 100 18 100 % --   07/26/19 1627 (!) 127/57 98.5 °F (36.9 °C) Oral 102 18 100 % --   07/26/19 1238 -- -- -- (!) 118 -- 100 % --   07/26/19 1151 (!) 122/57 (!) 101.2 °F (38.4 °C) Oral (!) 112 18 100 % --       Physical Exam:  Gen - Well developed, well nourished, no apparent distress  HEENT - Anicteric  CV - S1, S2, no murmurs/rubs  Lungs - CTA-B, normal excursion  Abd - Soft, mild tenderness to palpation across lower abdomen, no rebound or guarding, ND, normal BS's, no HSM.  Ext - No c/c/e  Neuro - No asterixis    Labs:  Recent Labs   Lab 07/27/19  0549   WBC 2.76*   RBC 3.83*   HGB 7.5*   HCT 25.8*      MCV 67*   MCH 19.6*   MCHC 29.1*     Recent Labs   Lab 07/27/19  0549   CALCIUM 8.3*      K 3.0*   CO2 20*   *   BUN 6   CREATININE 0.7     ESR = 46  CRP = 199.9    Imaging:  Reviewed CT, inflammatory changes of the terminal ileum and right colon    Assessment:  This patient is a 24 y.o. female with:   1.  Enterocolitis, per CT-unclear if infectious or inflammatory in etiology.  Clinically improving, on antibiotics.  Patient does have family history of Crohn's disease in her brother.  2.  Abdominal pain, diarrhea, vomiting-secondary to 1.    Recommendations:  1.  Monitor abdominal exam.  2.  Antibiotics  3.  Okay for bland diet  4.  If continues to clinically improve, will plan for outpatient colonoscopy in approximately 3-4 weeks, to rule out IBD.  5.   Follow-up with fecal calprotectin

## 2019-07-27 NOTE — PLAN OF CARE
Problem: Adult Inpatient Plan of Care  Goal: Plan of Care Review  Outcome: Ongoing (interventions implemented as appropriate)     07/27/19 0342   Plan of Care Review   Plan of Care Reviewed With patient   Progress improving       Problem: Fall Injury Risk  Goal: Absence of Fall and Fall-Related Injury  Outcome: Ongoing (interventions implemented as appropriate)  Intervention: Identify and Manage Contributors to Fall Injury Risk     07/27/19 0342   Manage Acute Allergic Reaction   Medication Review/Management medications reviewed   Identify and Manage Contributors to Fall Injury Risk   Self-Care Promotion BADL personal objects within reach;BADL personal routines maintained;independence encouraged         Problem: Pain Acute  Goal: Optimal Pain Control  Outcome: Ongoing (interventions implemented as appropriate)  Intervention: Develop Pain Management Plan     07/27/19 0342   Prevent or Manage Pain   Pain Management Interventions pain management plan reviewed with patient/caregiver;relaxation techniques promoted         Problem: Infection  Goal: Infection Symptom Resolution  Outcome: Ongoing (interventions implemented as appropriate)  Intervention: Prevent or Manage Infection     07/27/19 0342   Prevent or Manage Infection   Fever Reduction/Comfort Measures medication administered;lightweight bedding;lightweight clothing   Infection Management aseptic technique maintained

## 2019-07-27 NOTE — NURSING
Patient discharged per MD order. IV removed; catheter tip intact. No distress noted. Discharge instructions explained; patient verbalized understanding. VSS. Afebrile. No complaint of pain, n/v, diarrhea, or SOB. RX provided. Pt refused wheelchair; left with family to parking garage with belongings.

## 2019-07-27 NOTE — DISCHARGE SUMMARY
[facility] Hospital Medicine  Discharge Summary      Patient Name: Aziza Carter  MRN: [id number]  Admission Date: 2019  Hospital Length of Stay: 2 days  Discharge Date and Time:  2019 3:43 PM  Attending Physician: No att. providers found   Discharging Provider: Kadie Hector MD  Primary Care Provider: Lore Rodriguez NP      HPI:   Aziza Carter is a 24 y.o. female that (in part)  has no past medical history on file.  has a past surgical history that includes Induced  (2019). Presents to [facility] Emergency Department complaining of a fever.  Associated with nausea, vomiting, diarrhea, and periumbilical abdominal pain. Subacute onset with progressive worsening.  Moderate to severe intensity.  Radiation throughout abdomen to back.  Daily frequency.  Constant duration.  Patient reports having a portion 2 months ago.  Denies sick contacts or recent travel.  Does not think she contaminated food.  No hematemesis, melena, dyspepsia, or previous gastrointestinal problems to report.    In the emergency department routine laboratory studies, urinalysis, and CT of the abdomen pelvis obtained.  There is evidence enterocolitis.  Patient showing evidence of sepsis with tachycardia, tachypnea, and fever in the ED.  She also had hypokalemia.  Cultures were obtained and she was given broad-spectrum antibiotics, IV fluids, and potassium replacement.  Sepsis protocol initiated.  GI is consulted.    Hospital medicine has been asked to admit for further evaluation and treatment.     * No surgery found *      Hospital Course:   Aziza Carter is a 24 y.o. female that (in part)  has no past medical history on file.  has a past surgical history that includes Induced  (2019). Presents to [facility] Emergency Department complaining of a fever.  Associated with nausea, vomiting, diarrhea, and periumbilical abdominal pain.  Subacute onset with progressive worsening.  Moderate to severe intensity.  Radiation throughout abdomen to back.  Daily frequency.  Constant duration.  Patient reports having  2 months ago.  Denies sick contacts or recent travel.  Does not think she contaminated food.  No hematemesis, melena, dyspepsia, or previous gastrointestinal problems to report.  In the emergency department routine laboratory studies, urinalysis, and CT of the abdomen pelvis obtained.  There is evidence enterocolitis.  Patient showing evidence of severe sepsis with tachycardia, tachypnea, elevated lactic acid,and fever in the ED.  She also had hypokalemia.  Cultures were obtained and she was started on broad-spectrum antibiotics, IV fluids, and potassium replacement.  Sepsis protocol initiated.  GI was consulted.  her fever is gradually improved,,consulted OBGYN for evaluation duo to recent  with D&C.they did not think infection GYN related.GI want continue with Abx and follow up as out patient with colonoscopy to R/O crohn's,her symptoms much improved,fever is resolved,.elvated lactic acid is improved,blood culture was negative,she was tolerating diet,patient has been discharged home with PO Abx and follow up with PCP an dGI in next 1-2 weeks.         Consults:   Consults (From admission, onward)        Status Ordering Provider     Inpatient consult to Gastroenterology  Once     Provider:  Samuel Brownlee MD    Completed LIONEL OLEARY     Inpatient consult to Obstetrics / Gynecology  Once     Provider:  Emani Herrera MD    Acknowledged CROW DESOUZA     Inpatient consult to Social Work  Once     Provider:  (Not yet assigned)    Acknowledged CROW DESOUZA          No new Assessment & Plan notes have been filed under this hospital service since the last note was generated.  Service: Hospital Medicine    Final Active Diagnoses:    Diagnosis Date Noted POA    PRINCIPAL PROBLEM:  Severe sepsis  [A41.9, R65.20] 07/25/2019 Yes    Enterocolitis [K52.9] 07/25/2019 Yes    Hypokalemia [E87.6] 07/25/2019 Yes    Elevated lactic acid level [R79.89] 07/25/2019 Yes      Problems Resolved During this Admission:       Discharged Condition: stable    Disposition: Home or Self Care    Follow Up:  Follow-up Information     Lore Rodriguez NP In 1 week.    Specialty:  Family Medicine  Contact information:  [address] [place] [id number] [phone]             Hans Ren MD In 2 weeks.    Specialty:  Gastroenterology  Contact information:  [address] [place] [address]  [id number]  [facility]  [place] [id number] [phone]                 Patient Instructions:      Activity as tolerated       Significant Diagnostic Studies: Labs:   BMP:   Recent Labs   Lab 07/26/19  0530 07/27/19  0549   * 101    138   K 2.8* 3.0*    112*   CO2 21* 20*   BUN 6 6   CREATININE 0.8 0.7   CALCIUM 8.0* 8.3*   MG 1.6  --    , CMP   Recent Labs   Lab 07/26/19  0530 07/27/19  0549    138   K 2.8* 3.0*    112*   CO2 21* 20*   * 101   BUN 6 6   CREATININE 0.8 0.7   CALCIUM 8.0* 8.3*   ANIONGAP 7* 6*   ESTGFRAFRICA >60 >60   EGFRNONAA >60 >60    and CBC   Recent Labs   Lab 07/26/19  0530 07/27/19  0549   WBC 5.95 2.76*   HGB 7.9* 7.5*   HCT 26.8* 25.8*   * 165     Microbiology:   Blood Culture   Lab Results   Component Value Date    LABBLOO No Growth to date 07/25/2019    LABBLOO No Growth to date 07/25/2019    LABBLOO No Growth to date 07/25/2019     Radiology: CT scan: CT ABDOMEN PELVIS WITH CONTRAST:   Results for orders placed or performed during the hospital encounter of 07/25/19   CT Abdomen Pelvis With Contrast    Narrative    EXAMINATION:  CT ABDOMEN PELVIS WITH CONTRAST    CLINICAL HISTORY:  Infection, abdomen-pelvis;    TECHNIQUE:  Low dose axial images, sagittal and coronal reformations were obtained from the lung bases to the pubic symphysis following  the IV administration of 100 mL of Omnipaque 350 .  Oral contrast was not given.    COMPARISON:  Ultrasound of the abdomen dated 02/02/2019.    FINDINGS:  There are no pleural effusions.  There is no evidence of a pneumothorax.  No airspace opacity is present.  No discrete pulmonary nodule is identified.    The heart is unremarkable.  There is normal tapering of the abdominal aorta.  The aortic branch vessels are within normal limits.  The portal veins and mesenteric vessels are unremarkable.  The IVC and the remainder of the venous structures are unremarkable.  There is no evidence of lymphadenopathy in the abdomen or pelvis.    The esophagus, stomach, and duodenum are within normal limits.  There is fluid distention and wall thickening of the small bowel loops.  There is marked wall thickening involving the terminal ileum.  The visualized portions of the appendix appear grossly unremarkable.  There is wall thickening of the entire large bowel, predominantly the right-sided colon and the transverse colon.  There is also fluid distention of the large bowel loops.    The liver is borderline enlarged.  The gallbladder is within normal limits.  The biliary tree is unremarkable.  The spleen is enlarged.  The pancreas is within normal limits.    The adrenal glands are unremarkable.  The kidneys are within normal limits.  The ureters are unremarkable.  There is wall thickening of the urinary bladder.  The uterus and adnexal structures are within normal limits.    There is trace amount of free fluid the pelvis.  There is no evidence of free air there is no evidence of pneumatosis.  No portal venous air is identified.    The psoas margins are unremarkable.  The abdominal wall is within normal limits.  The osseous structures are unremarkable.      Impression    Marked wall thickening of the terminal ileum, right-sided colon, and transverse colon with lesser degree of wall thickening involving the remainder of the large  bowel.  The findings are consistent with enterocolitis.  No evidence of bowel obstruction.    Borderline splenomegaly.      Electronically signed by: Bart Rhodes MD  Date:    07/25/2019  Time:    16:20    and CT ABDOMEN PELVIS WITHOUT CONTRAST: No results found for this visit on 07/25/19.    Pending Diagnostic Studies:     Procedure Component Value Units Date/Time    Calprotectin [[id number]] Collected:  07/26/19 1500    Order Status:  Sent Lab Status:  In process Updated:  07/26/19 1633    Specimen:  Stool     EKG 12-lead [[id number]] Collected:  07/25/19 1341    Order Status:  Sent Lab Status:  In process Updated:  07/26/19 0621    Narrative:       Test Reason : K52.9,    Vent. Rate : 124 BPM     Atrial Rate : 124 BPM     P-R Int : 148 ms          QRS Dur : 078 ms      QT Int : 300 ms       P-R-T Axes : 052 073 012 degrees     QTc Int : 431 ms    Sinus tachycardia  Nonspecific T wave abnormality  Abnormal ECG  When compared with ECG of 19-NOV-2018 06:51,  Significant changes have occurred    Referred By: [email or web address]   SELF           Confirmed By:     Stool Exam-Ova,Cysts,Parasites [[id number]] Collected:  07/25/19 2035    Order Status:  Sent Lab Status:  In process Updated:  07/26/19 0822    Specimen:  Stool          Medications:  Reconciled Home Medications:      Medication List      START taking these medications    ciprofloxacin HCl 500 MG tablet  Commonly known as:  CIPRO  Take 1 tablet (500 mg total) by mouth every 12 (twelve) hours. for 10 days     metroNIDAZOLE 500 MG tablet  Commonly known as:  FLAGYL  Take 1 tablet (500 mg total) by mouth every 12 (twelve) hours. for 10 days        CONTINUE taking these medications    famotidine 20 MG tablet  Commonly known as:  PEPCID  Take 1 tablet (20 mg total) by mouth 2 (two) times daily before meals. for 30 days     fluticasone propionate 50 mcg/actuation nasal spray  Commonly known as:  FLONASE  1 spray by Each Nare route 2 (two) times daily as needed for Rhinitis.      ondansetron 4 MG tablet  Commonly known as:  ZOFRAN  Take 1 tablet (4 mg total) by mouth every 8 (eight) hours as needed for Nausea.     pantoprazole 20 MG tablet  Commonly known as:  PROTONIX  Take 1 tablet (20 mg total) by mouth once daily.        STOP taking these medications    benzonatate 100 MG capsule  Commonly known as:  TESSALON     cetirizine 10 MG tablet  Commonly known as:  ZYRTEC            Indwelling Lines/Drains at time of discharge:   Lines/Drains/Airways          None          Time spent on the discharge of patient: over 30  minutes  Patient was seen and examined on the date of discharge and determined to be suitable for discharge.         Kadie Hector MD  Department of Hospital Medicine  [facility]

## 2019-07-27 NOTE — PLAN OF CARE
"EDUCATION:  SW provided with educational information on Sepsis  Information reviewed and placed in :My Healthcare Packet" to be brought home for her to use as resource after discharge.  Information included:  signs and symptoms to look for and call the doctor if experiencing, and symptoms that may indicate a medical emergency: CALL 911.      All questions answered.  Teach back method used.    Patient stated, "shortness of breath and shaking  ".     07/27/19 1301   Final Note   Assessment Type Final Discharge Note   Anticipated Discharge Disposition Home   What phone number can be called within the next 1-3 days to see how you are doing after discharge? [phone]   Hospital Follow Up  Appt(s) scheduled? Yes   Discharge plans and expectations educations in teach back method with documentation complete? Yes     "

## 2019-07-27 NOTE — PLAN OF CARE
"EDUCATION:  SW provided with educational information on Sepsis  Information reviewed and placed in :My Healthcare Packet" to be brought home for her to use as resource after discharge.  Information included:  signs and symptoms to look for and call the doctor if experiencing, and symptoms that may indicate a medical emergency: CALL 911.         07/27/19 1301   Final Note   Assessment Type Final Discharge Note   Anticipated Discharge Disposition Home   What phone number can be called within the next 1-3 days to see how you are doing after discharge? [phone]   Hospital Follow Up  Appt(s) scheduled? Yes   Discharge plans and expectations educations in teach back method with documentation complete? Yes     "

## 2019-07-28 LAB — BACTERIA STL CULT: NORMAL

## 2019-07-29 LAB — O+P STL TRI STN: NORMAL

## 2019-07-30 LAB
BACTERIA BLD CULT: NORMAL
BACTERIA BLD CULT: NORMAL

## 2019-08-03 LAB — CALPROTECTIN STL-MCNT: 264.1 MCG/G

## 2020-12-13 LAB
B-HCG UR QL: POSITIVE
CTP QC/QA: YES
CTP QC/QA: YES
SARS-COV-2 RDRP RESP QL NAA+PROBE: NEGATIVE

## 2020-12-13 PROCEDURE — U0002 COVID-19 LAB TEST NON-CDC: HCPCS | Performed by: EMERGENCY MEDICINE

## 2020-12-13 PROCEDURE — 93010 ELECTROCARDIOGRAM REPORT: CPT | Mod: ,,, | Performed by: INTERNAL MEDICINE

## 2020-12-13 PROCEDURE — 93010 EKG 12-LEAD: ICD-10-PCS | Mod: ,,, | Performed by: INTERNAL MEDICINE

## 2020-12-13 PROCEDURE — 93005 ELECTROCARDIOGRAM TRACING: CPT

## 2020-12-13 PROCEDURE — 99285 EMERGENCY DEPT VISIT HI MDM: CPT | Mod: 25

## 2020-12-13 PROCEDURE — 81025 URINE PREGNANCY TEST: CPT | Performed by: EMERGENCY MEDICINE

## 2020-12-14 ENCOUNTER — HOSPITAL ENCOUNTER (EMERGENCY)
Facility: HOSPITAL | Age: 25
Discharge: HOME | End: 2020-12-14
Attending: EMERGENCY MEDICINE
Payer: MEDICAID

## 2020-12-14 VITALS
BODY MASS INDEX: 32.49 KG/M2 | TEMPERATURE: 99 F | HEIGHT: 65 IN | HEART RATE: 92 BPM | DIASTOLIC BLOOD PRESSURE: 71 MMHG | WEIGHT: 195 LBS | RESPIRATION RATE: 18 BRPM | SYSTOLIC BLOOD PRESSURE: 133 MMHG | OXYGEN SATURATION: 100 %

## 2020-12-14 DIAGNOSIS — Z33.1 INCIDENTAL PREGNANCY: ICD-10-CM

## 2020-12-14 DIAGNOSIS — K52.9 GASTROENTERITIS: ICD-10-CM

## 2020-12-14 DIAGNOSIS — R07.9 CHEST PAIN: Primary | ICD-10-CM

## 2020-12-14 LAB
AMORPH CRY URNS QL MICRO: ABNORMAL
BACTERIA #/AREA URNS HPF: ABNORMAL /HPF
BILIRUB UR QL STRIP: NEGATIVE
CLARITY UR: ABNORMAL
COLOR UR: YELLOW
GLUCOSE UR QL STRIP: NEGATIVE
HGB UR QL STRIP: NEGATIVE
HYALINE CASTS #/AREA URNS LPF: 0 /LPF
KETONES UR QL STRIP: NEGATIVE
LEUKOCYTE ESTERASE UR QL STRIP: ABNORMAL
MICROSCOPIC COMMENT: ABNORMAL
NITRITE UR QL STRIP: NEGATIVE
PH UR STRIP: 6 [PH] (ref 5–8)
PROT UR QL STRIP: ABNORMAL
RBC #/AREA URNS HPF: 0 /HPF (ref 0–4)
SP GR UR STRIP: >1.03 (ref 1–1.03)
URN SPEC COLLECT METH UR: ABNORMAL
UROBILINOGEN UR STRIP-ACNC: ABNORMAL EU/DL
WBC #/AREA URNS HPF: 0 /HPF (ref 0–5)

## 2020-12-14 PROCEDURE — 81000 URINALYSIS NONAUTO W/SCOPE: CPT

## 2020-12-14 RX ORDER — FAMOTIDINE 20 MG/1
20 TABLET, FILM COATED ORAL 2 TIMES DAILY
Qty: 28 TABLET | Refills: 0 | Status: SHIPPED | OUTPATIENT
Start: 2020-12-14 | End: 2020-12-28

## 2020-12-14 RX ORDER — GUAIFENESIN 100 MG/5ML
200 SOLUTION ORAL EVERY 4 HOURS PRN
Qty: 120 ML | Refills: 0 | Status: SHIPPED | OUTPATIENT
Start: 2020-12-14 | End: 2020-12-24

## 2020-12-14 RX ORDER — FLUTICASONE PROPIONATE 50 MCG
2 SPRAY, SUSPENSION (ML) NASAL DAILY PRN
Qty: 15 G | Refills: 0 | Status: SHIPPED | OUTPATIENT
Start: 2020-12-14

## 2020-12-14 RX ORDER — FAMOTIDINE 20 MG
1 TABLET ORAL DAILY
Qty: 30 TABLET | Refills: 8 | OUTPATIENT
Start: 2020-12-14 | End: 2020-12-16

## 2020-12-14 NOTE — ED PROVIDER NOTES
Encounter Date: 2020       History     Chief Complaint   Patient presents with    Chills     pt reports chills, fatigue, & intermittent upper middle/left chest pain ongoing 4 days; denies known Covid positive contacts; denies fever; no meds taken for symptoms; LMP begining of last month; pt reports hx of similar symptoms last time she was pregnant     25-year-old female with chief complaint chest pain all day today.    States she begin with midsternal chest pain for a few moments last week.  Pain spontaneously resolved.  She describes the pain as pressure.  There is no radiation of chest pain.  She states pain typically midsternal, however at moments to left inframammary region.  She denies any associated shortness of breath.   No fever, no myalgias.  She admits to frequent nasal congestion and nonproductive cough times years.  She does admit to congestion and nonproductive cough over the past few days.  She admits to chills over the past few days.    She states today while at work experience chest pain to left inframammary chest all day today.  She admits to fatigue, generalized weakness.  She denies any exertional chest pain.  She denies shortness of breath.  She denies pleuritic pain.    She admits to multiple episodes of nonbloody loose stool since yesterday.  She admits to mild left lower quadrant cramping pain over the past few days.  Denies significant abdominal pain at this time.  No associated nausea vomiting.  She denies vaginal bleeding, spotting, discharge.    LMP approximately 2020.        Review of patient's allergies indicates:  No Known Allergies  History reviewed. No pertinent past medical history.  Past Surgical History:   Procedure Laterality Date    INDUCED   2019     Family History   Problem Relation Age of Onset    Hypertension Maternal Grandfather     No Known Problems Mother     Hypertension Father      Social History     Tobacco Use    Smoking status: Never  Smoker    Smokeless tobacco: Never Used   Substance Use Topics    Alcohol use: Yes     Alcohol/week: 0.0 standard drinks     Comment: occasionally    Drug use: Yes     Types: Marijuana     Review of Systems   Constitutional: Positive for chills and fatigue. Negative for appetite change and fever.   Respiratory: Positive for cough. Negative for chest tightness, shortness of breath and wheezing.    Cardiovascular: Positive for chest pain. Negative for palpitations and leg swelling.   Gastrointestinal: Negative for abdominal pain, nausea and vomiting.   Genitourinary: Positive for pelvic pain. Negative for dysuria, flank pain, vaginal bleeding, vaginal discharge and vaginal pain.   Musculoskeletal: Negative for back pain, myalgias, neck pain and neck stiffness.   Skin: Negative for rash.   Neurological: Positive for weakness.       Physical Exam     Initial Vitals [12/13/20 2258]   BP Pulse Resp Temp SpO2   135/87 108 18 99.6 °F (37.6 °C) 100 %      MAP       --         Physical Exam    Nursing note and vitals reviewed.  Constitutional: She appears well-developed and well-nourished. She is not diaphoretic. No distress.   Well-appearing nontoxic.  Sitting upright on exam table.   HENT:   Head: Normocephalic and atraumatic.   Eyes: EOM are normal.   Neck: Normal range of motion. Neck supple.   Cardiovascular: Intact distal pulses.   No murmur heard.  Pulmonary/Chest: Breath sounds normal. No respiratory distress. She has no wheezes. She has no rhonchi.   There is midsternal chest tenderness.   Abdominal: Soft. Bowel sounds are normal. She exhibits no distension. There is no abdominal tenderness.   Musculoskeletal: Normal range of motion. No tenderness.   Neurological: She is alert and oriented to person, place, and time. GCS score is 15. GCS eye subscore is 4. GCS verbal subscore is 5. GCS motor subscore is 6.   Skin: Skin is warm.   Psychiatric: She has a normal mood and affect. Thought content normal.         ED  "Course   Procedures  Labs Reviewed   URINALYSIS, REFLEX TO URINE CULTURE - Abnormal; Notable for the following components:       Result Value    Appearance, UA Cloudy (*)     Specific Gravity, UA >1.030 (*)     Protein, UA 1+ (*)     Urobilinogen, UA 2.0-3.0 (*)     Leukocytes, UA 1+ (*)     All other components within normal limits    Narrative:     Specimen Source->Urine   URINALYSIS MICROSCOPIC - Abnormal; Notable for the following components:    Amorphous, UA Many (*)     All other components within normal limits    Narrative:     Specimen Source->Urine   POCT URINE PREGNANCY - Abnormal; Notable for the following components:    POC Preg Test, Ur Positive (*)     All other components within normal limits   SARS-COV-2 RDRP GENE     EKG Readings: (Independently Interpreted)   Sinus tachycardia, ventricular rate 113 beats minute.  No evidence of acute ischemia, arrhythmia, or heart block.  No ST elevation.  No gross change from previous.       Imaging Results          X-Ray Chest AP Portable (Final result)  Result time 12/14/20 00:40:33    Final result by Srini Churchill MD (12/14/20 00:40:33)                 Impression:      No acute cardiopulmonary finding identified on this single view.      Electronically signed by: Srini Churchill MD  Date:    12/14/2020  Time:    00:40             Narrative:    EXAMINATION:  XR CHEST AP PORTABLE    CLINICAL HISTORY:  Provided history is "chest pain;  ".    TECHNIQUE:  One view of the chest.    COMPARISON:  07/25/2019.    FINDINGS:  Cardiac wires overlie the chest.  Cardiomediastinal silhouette is not enlarged.  No focal consolidation.  No sizable pleural effusion.  No pneumothorax.                                 Medical Decision Making:   Initial Assessment:   25-year-old female with chief complaint constant midsternal chest pain times today.  She states she had a similar episode last week which only lasted movements.  She denies history of cardiac issues.  No pulmonary " issues.  Nonsmoker.  She does admit to a recent cough with nasal congestion, which she states is chronic and related to seasonal allergies.  She denies any recent illness or sick contacts.  She denies shortness of breath.  No fever or myalgias.  She does admit to chills.  She admits to diarrhea x2 days.  She states she feels generally unwell today, weak, fatigued.  Differential Diagnosis:   Viral illness, pneumonia, bronchitis, pharyngitis, MI, arrhythmia, GERD, gastritis, UTI  Clinical Tests:   Lab Tests: Ordered and Reviewed  Radiological Study: Ordered and Reviewed  ED Management:  Patient states that chest pain is constant, states migrates from left inframammary region to midsternal chest today. There is reproducible midsternal chest tenderness.  She states the pain moves from her left inframammary region to her midsternal region.  Pain has been constant today.  No cardiac history.  Low suspicion for ACS at this time.  Denies palpitations.  She denies shortness of breath.  She denies pleuritic pain. No history of thrombophilia.  No recent surgery.  No major trauma. No recent immobility.  No active cancer. Patient is less than 50 years old.  No history of percutaneous indwelling catheter. No previous DVT/PE. Despite pregnancy, I think inconsistent with PE as culprit of CP. No SOB or GRECO.    She does admit to mild left-sided pelvic cramping over the past few days, however no abdominal tenderness on exam.  No vaginal bleeding, spotting, discharge.  No urinary complaints.  Urine is clean.  No fever. Will have her f/u with OB. She will return if worsening pain, n/v, vaginal complaints.    She states she will did experience chest pain during her last pregnancy.  She had suspicion of pregnancy upon arrival today.    She is well-appearing nontoxic with no significant comorbidities.  Vitals are reassuring.  She may indeed have viral illness such as gastroenteritis given diarrhea, generally feeling unwell.    She appears  to be tachycardic at nearly 100 beats per minute on each visit.  Mucous membranes moist.  She denies change in intake.  She does admit to multiple episodes of loose stool since yesterday.  Advised lots of fluids.    ED return precautions discussed.                             Clinical Impression:       ICD-10-CM ICD-9-CM   1. Chest pain  R07.9 786.50   2. Gastroenteritis  K52.9 558.9   3. Incidental pregnancy  Z33.1 V22.2                      Disposition:   Disposition: Discharged  Condition: Stable     ED Disposition Condition    Discharge Stable        ED Prescriptions     Medication Sig Dispense Start Date End Date Auth. Provider    famotidine (PEPCID) 20 MG tablet Take 1 tablet (20 mg total) by mouth 2 (two) times daily. for 14 days 28 tablet 12/14/2020 12/28/2020 Surendra Terrell PA-C    prenatal 21-iron fu-folic acid (PRENATAL COMPLETE) 14 mg iron- 400 mcg Tab Take 1 tablet by mouth once daily. 30 tablet 12/14/2020 12/14/2021 Surendra Terrell PA-C    fluticasone propionate (FLONASE) 50 mcg/actuation nasal spray 2 sprays (100 mcg total) by Each Nostril route daily as needed for Rhinitis. 15 g 12/14/2020  Surendra Terrell PA-C    guaifenesin 100 mg/5 ml (ROBITUSSIN) 100 mg/5 mL syrup Take 10 mLs (200 mg total) by mouth every 4 (four) hours as needed for Cough. 120 mL 12/14/2020 12/24/2020 Surendra Terrell PA-C        Follow-up Information     Follow up With Specialties Details Why Contact Info    [facility]  Schedule an appointment as soon as possible for a visit  To establish OBGYN, to establish prenatal care [address] [place] [id number] [phone]    Lore Rodriguez, NP Family Medicine Schedule an appointment as soon as possible for a visit  For reevaluation of chest pain, to ensure resolution of diarrhea, generally feeling unwell. [address] [facility]  [place] [id number] [phone]                                         Surendra Terrell PA-C  12/14/20  [id number]

## 2020-12-14 NOTE — DISCHARGE INSTRUCTIONS
Drink lots of fluids, stay well hydrated.  Over-the-counter Flonase as needed for congestion.  Robitussin for cough.  Follow-up with your OBGYN to establish prenatal care.  Begin taking prenatal vitamins.  Pepcid twice daily.    Return to this ED if you experience worsening pelvic pain, if you begin with vaginal bleeding, if you begin with uncontrolled vomiting, if you begin with fever, if worsening chest pain despite treatment, if you begin with shortness of breath, if any other problems occur.

## 2020-12-16 ENCOUNTER — HOSPITAL ENCOUNTER (EMERGENCY)
Facility: HOSPITAL | Age: 25
Discharge: HOME | End: 2020-12-16
Attending: EMERGENCY MEDICINE
Payer: MEDICAID

## 2020-12-16 VITALS
SYSTOLIC BLOOD PRESSURE: 117 MMHG | OXYGEN SATURATION: 100 % | HEART RATE: 95 BPM | RESPIRATION RATE: 20 BRPM | BODY MASS INDEX: 34.82 KG/M2 | TEMPERATURE: 98 F | DIASTOLIC BLOOD PRESSURE: 65 MMHG | HEIGHT: 65 IN | WEIGHT: 209 LBS

## 2020-12-16 DIAGNOSIS — Z3A.01 5 WEEKS GESTATION OF PREGNANCY: ICD-10-CM

## 2020-12-16 DIAGNOSIS — O20.0 THREATENED ABORTION: Primary | ICD-10-CM

## 2020-12-16 DIAGNOSIS — B96.89 BV (BACTERIAL VAGINOSIS): ICD-10-CM

## 2020-12-16 DIAGNOSIS — N76.0 BV (BACTERIAL VAGINOSIS): ICD-10-CM

## 2020-12-16 LAB
ABO + RH BLD: NORMAL
ALBUMIN SERPL BCP-MCNC: 4.2 G/DL (ref 3.5–5.2)
ALP SERPL-CCNC: 44 U/L (ref 55–135)
ALT SERPL W/O P-5'-P-CCNC: 13 U/L (ref 10–44)
ANION GAP SERPL CALC-SCNC: 11 MMOL/L (ref 8–16)
AST SERPL-CCNC: 13 U/L (ref 10–40)
B-HCG UR QL: POSITIVE
BACTERIA #/AREA URNS HPF: NORMAL /HPF
BACTERIA GENITAL QL WET PREP: ABNORMAL
BASOPHILS # BLD AUTO: 0.02 K/UL (ref 0–0.2)
BASOPHILS NFR BLD: 0.3 % (ref 0–1.9)
BILIRUB SERPL-MCNC: 0.1 MG/DL (ref 0.1–1)
BILIRUB UR QL STRIP: NEGATIVE
BUN SERPL-MCNC: 16 MG/DL (ref 6–20)
CALCIUM SERPL-MCNC: 9.6 MG/DL (ref 8.7–10.5)
CHLORIDE SERPL-SCNC: 106 MMOL/L (ref 95–110)
CLARITY UR: CLEAR
CLUE CELLS VAG QL WET PREP: ABNORMAL
CO2 SERPL-SCNC: 22 MMOL/L (ref 23–29)
COLOR UR: YELLOW
CREAT SERPL-MCNC: 0.7 MG/DL (ref 0.5–1.4)
CTP QC/QA: YES
DIFFERENTIAL METHOD: ABNORMAL
EOSINOPHIL # BLD AUTO: 0.1 K/UL (ref 0–0.5)
EOSINOPHIL NFR BLD: 1 % (ref 0–8)
ERYTHROCYTE [DISTWIDTH] IN BLOOD BY AUTOMATED COUNT: 17.6 % (ref 11.5–14.5)
EST. GFR  (AFRICAN AMERICAN): >60 ML/MIN/1.73 M^2
EST. GFR  (NON AFRICAN AMERICAN): >60 ML/MIN/1.73 M^2
FILAMENT FUNGI VAG WET PREP-#/AREA: ABNORMAL
GLUCOSE SERPL-MCNC: 82 MG/DL (ref 70–110)
GLUCOSE UR QL STRIP: NEGATIVE
HCG INTACT+B SERPL-ACNC: 4174 MIU/ML
HCT VFR BLD AUTO: 35.7 % (ref 37–48.5)
HGB BLD-MCNC: 11.1 G/DL (ref 12–16)
HGB UR QL STRIP: NEGATIVE
IMM GRANULOCYTES # BLD AUTO: 0.02 K/UL (ref 0–0.04)
IMM GRANULOCYTES NFR BLD AUTO: 0.3 % (ref 0–0.5)
KETONES UR QL STRIP: NEGATIVE
LEUKOCYTE ESTERASE UR QL STRIP: ABNORMAL
LYMPHOCYTES # BLD AUTO: 1.8 K/UL (ref 1–4.8)
LYMPHOCYTES NFR BLD: 27 % (ref 18–48)
MCH RBC QN AUTO: 24.3 PG (ref 27–31)
MCHC RBC AUTO-ENTMCNC: 31.1 G/DL (ref 32–36)
MCV RBC AUTO: 78 FL (ref 82–98)
MICROSCOPIC COMMENT: NORMAL
MONOCYTES # BLD AUTO: 0.4 K/UL (ref 0.3–1)
MONOCYTES NFR BLD: 6.5 % (ref 4–15)
NEUTROPHILS # BLD AUTO: 4.4 K/UL (ref 1.8–7.7)
NEUTROPHILS NFR BLD: 64.9 % (ref 38–73)
NITRITE UR QL STRIP: NEGATIVE
NRBC BLD-RTO: 0 /100 WBC
PH UR STRIP: 6 [PH] (ref 5–8)
PLATELET # BLD AUTO: 235 K/UL (ref 150–350)
PMV BLD AUTO: 11.3 FL (ref 9.2–12.9)
POTASSIUM SERPL-SCNC: 3.7 MMOL/L (ref 3.5–5.1)
PROT SERPL-MCNC: 8 G/DL (ref 6–8.4)
PROT UR QL STRIP: NEGATIVE
RBC # BLD AUTO: 4.56 M/UL (ref 4–5.4)
RBC #/AREA URNS HPF: 1 /HPF (ref 0–4)
SODIUM SERPL-SCNC: 139 MMOL/L (ref 136–145)
SP GR UR STRIP: >1.03 (ref 1–1.03)
SPECIMEN SOURCE: ABNORMAL
SQUAMOUS #/AREA URNS HPF: 5 /HPF
T VAGINALIS GENITAL QL WET PREP: ABNORMAL
URN SPEC COLLECT METH UR: ABNORMAL
UROBILINOGEN UR STRIP-ACNC: NEGATIVE EU/DL
WBC # BLD AUTO: 6.78 K/UL (ref 3.9–12.7)
WBC #/AREA URNS HPF: 3 /HPF (ref 0–5)
WBC #/AREA VAG WET PREP: ABNORMAL
YEAST GENITAL QL WET PREP: ABNORMAL

## 2020-12-16 PROCEDURE — 96360 HYDRATION IV INFUSION INIT: CPT

## 2020-12-16 PROCEDURE — 87210 SMEAR WET MOUNT SALINE/INK: CPT

## 2020-12-16 PROCEDURE — 86901 BLOOD TYPING SEROLOGIC RH(D): CPT

## 2020-12-16 PROCEDURE — 81000 URINALYSIS NONAUTO W/SCOPE: CPT

## 2020-12-16 PROCEDURE — 80053 COMPREHEN METABOLIC PANEL: CPT

## 2020-12-16 PROCEDURE — 84702 CHORIONIC GONADOTROPIN TEST: CPT

## 2020-12-16 PROCEDURE — 81025 URINE PREGNANCY TEST: CPT | Performed by: PHYSICIAN ASSISTANT

## 2020-12-16 PROCEDURE — 25000003 PHARM REV CODE 250: Performed by: NURSE PRACTITIONER

## 2020-12-16 PROCEDURE — 85025 COMPLETE CBC W/AUTO DIFF WBC: CPT

## 2020-12-16 PROCEDURE — 99284 EMERGENCY DEPT VISIT MOD MDM: CPT | Mod: 25

## 2020-12-16 PROCEDURE — 96361 HYDRATE IV INFUSION ADD-ON: CPT

## 2020-12-16 PROCEDURE — 87491 CHLMYD TRACH DNA AMP PROBE: CPT

## 2020-12-16 RX ORDER — METRONIDAZOLE 500 MG/1
500 TABLET ORAL 2 TIMES DAILY
Qty: 14 TABLET | Refills: 0 | Status: SHIPPED | OUTPATIENT
Start: 2020-12-16 | End: 2020-12-23

## 2020-12-16 RX ORDER — FAMOTIDINE 20 MG
1 TABLET ORAL DAILY
Qty: 30 TABLET | Refills: 0 | Status: SHIPPED | OUTPATIENT
Start: 2020-12-16 | End: 2021-12-16

## 2020-12-16 RX ADMIN — SODIUM CHLORIDE 1000 ML: 0.9 INJECTION, SOLUTION INTRAVENOUS at 05:12

## 2020-12-16 NOTE — ED TRIAGE NOTES
"Pt. Reports she is about 5 weeks pregnant and started to have abd cramps. Pt. Reports she is " spotting". Pt. Reports she was seen by her OB-GYN and they did not due an ultrasound.  "

## 2020-12-16 NOTE — ED PROVIDER NOTES
Encounter Date: 2020       History     Chief Complaint   Patient presents with    Abdominal Cramping     started this am    Vaginal Bleeding     pregnant not sure how far alone ,started today      CC:  Vaginal bleeding    HPI:  This is a 25-year-old female G3 P  approximately 5 weeks and 6 days pregnant (LMP 2020) presenting to the ED with 1 day history of vaginal spotting and mild abdominal cramping.  No attempted treatment prior to arrival.  She saw her OBGYN on Monday, however has not had an ultrasound yet for this pregnancy.  Denies fever, chills, chest pain, shortness breath, nausea, vomiting, diarrhea.    The history is provided by the patient. No  was used.     Review of patient's allergies indicates:  No Known Allergies  History reviewed. No pertinent past medical history.  Past Surgical History:   Procedure Laterality Date    INDUCED   2019     Family History   Problem Relation Age of Onset    Hypertension Maternal Grandfather     No Known Problems Mother     Hypertension Father      Social History     Tobacco Use    Smoking status: Never Smoker    Smokeless tobacco: Never Used   Substance Use Topics    Alcohol use: Yes     Alcohol/week: 0.0 standard drinks     Comment: occasionally    Drug use: Yes     Types: Marijuana     Review of Systems   Constitutional: Negative for chills and fever.   HENT: Negative for sore throat.    Respiratory: Negative for shortness of breath.    Cardiovascular: Negative for chest pain.   Gastrointestinal: Negative for abdominal pain, nausea and vomiting.   Genitourinary: Positive for pelvic pain and vaginal bleeding. Negative for dysuria.   Musculoskeletal: Negative for back pain.   Skin: Negative for rash.   Neurological: Negative for weakness.   Hematological: Does not bruise/bleed easily.       Physical Exam     Initial Vitals [20 1623]   BP Pulse Resp Temp SpO2   (!) 140/62 101 18 98.7 °F (37.1 °C) 100 %       MAP       --         Physical Exam    Constitutional: She appears well-developed and well-nourished. She is not diaphoretic. No distress.   HENT:   Head: Normocephalic and atraumatic.   Neck: Normal range of motion.   Cardiovascular: Normal rate, regular rhythm, normal heart sounds and intact distal pulses.   Pulmonary/Chest: Breath sounds normal. No respiratory distress.   Abdominal: Soft. Bowel sounds are normal. There is no abdominal tenderness.   Genitourinary:    Vagina and uterus normal.   There is no rash, tenderness, lesion or injury on the right labia. There is no rash, tenderness, lesion or injury on the left labia. Cervix exhibits no motion tenderness, no discharge and no friability. Right adnexum displays no mass, no tenderness and no fullness. Left adnexum displays no mass, no tenderness and no fullness.    Genitourinary Comments: Cervical os closed.  No bleeding.  No significant discharge.   exam chaperoned by RN.     Musculoskeletal: Normal range of motion.   Neurological: She is alert and oriented to person, place, and time.   Skin: Skin is warm and dry.   Psychiatric: She has a normal mood and affect. Her behavior is normal.         ED Course   Procedures  Labs Reviewed   CBC W/ AUTO DIFFERENTIAL - Abnormal; Notable for the following components:       Result Value    Hemoglobin 11.1 (*)     Hematocrit 35.7 (*)     MCV 78 (*)     MCH 24.3 (*)     MCHC 31.1 (*)     RDW 17.6 (*)     All other components within normal limits   COMPREHENSIVE METABOLIC PANEL - Abnormal; Notable for the following components:    CO2 22 (*)     Alkaline Phosphatase 44 (*)     All other components within normal limits   URINALYSIS, REFLEX TO URINE CULTURE - Abnormal; Notable for the following components:    Specific Gravity, UA >1.030 (*)     Leukocytes, UA 1+ (*)     All other components within normal limits    Narrative:     Specimen Source->Urine   VAGINAL SCREEN - Abnormal; Notable for the following components:     Clue Cells Occasional (*)     WBC - Vaginal Screen Few (*)     Bacteria - Vaginal Screen Occasional (*)     All other components within normal limits   POCT URINE PREGNANCY - Abnormal; Notable for the following components:    POC Preg Test, Ur Positive (*)     All other components within normal limits   C. TRACHOMATIS/N. GONORRHOEAE BY AMP DNA   HCG, QUANTITATIVE, PREGNANCY   URINALYSIS MICROSCOPIC    Narrative:     Specimen Source->Urine   GROUP & RH          Imaging Results          US OB <14 Wks TransAbd & TransVag, Single Gestation (XPD) (Final result)  Result time 12/16/20 18:16:20    Final result by Sandrine Gallo MD (12/16/20 18:16:20)                 Impression:      Pregnancy of unknown viability.  Small intrauterine gestational sac is seen which would correspond with estimated gestational age of 5 weeks 1 day, although no fetal pole is seen at this time.  Recommend serial beta HCGs and repeat pelvic ultrasound in 1-2 weeks.      Electronically signed by: Sandrine Gallo MD  Date:    12/16/2020  Time:    18:16             Narrative:    EXAMINATION:  US OB <14 WEEKS, TRANSABDOM & TRANSVAG, SINGLE GESTATION (XPD)    CLINICAL HISTORY:  Vag Bleeding;    TECHNIQUE:  Transabdominal sonography of the pelvis was performed, followed by transvaginal sonography to better evaluate the uterus and ovaries.    COMPARISON:  None.    FINDINGS:  The uterus measures 8.9 x 4.5 x 6.1 cm. Uterine parenchyma is heterogenous in echotexture. In the uterine cavity there is a gestational sac with a mean diameter of 0.6 cm which would correspond with estimated gestational age of 5 weeks 1 day.  No fetal pole or yolk sac seen at this time.    The right ovary measures 3.2 x 1.5 x 2.1 cm. The left ovary measures 4.4 x 2.1 x 3.2 cm. Arterial and venous flow are preserved bilaterally. A suspected corpus luteum cyst measuring 2.2 x 1.7 x 1.4 cm is seen in the left ovary.  Additional small left ovarian cyst is seen measuring 2.3 x 2.2 x  1.9 cm.  No adnexal abnormalities are visualized.  Trace physiologic pelvic free fluid is seen.                                 Medical Decision Making:   ED Management:  Patient presents with vaginal bleeding and crampy pelvic pain.  Patient is UPT positive. The patient's beta-hCG is 4174.  Pelvic examination reveals that the cervix is closed.  There was no cervical motion tenderness or adnexal tenderness.  There was no discharge or active signs of bleeding.  I doubt tubo-ovarian abscess, PID, cervicitis, or vaginitis.  The patient's blood type is O positive.   The patient does not require RhoGAM at this time.  Transvaginal ultrasound shows IUP with gestational age of 5 weeks and 1 day.  No fetal pole seen at this time.  Technically, pregnancy of unknown viability.  This was discussed with patient who will follow-up with her OBGYN for repeat ultrasound in 1-2 weeks.    Pt agrees with assessment, disposition and treatment plan and has no further questions or complaints at this time. The patient will need to follow up with her OB/GYN as soon as possible. I have given her strict return precautions. The patient should continue taking prenatal vitamins.  I discussed the need to have pelvic rest, avoiding heavy lifting and abstaining from sexual intercourse.                             Clinical Impression:     ICD-10-CM ICD-9-CM   1. Threatened   O20.0 640.00   2. BV (bacterial vaginosis)  N76.0 616.10    B96.89 041.9   3. 5 weeks gestation of pregnancy  Z3A.01 V22.2                      Disposition:   Disposition: Discharged  Condition: Stable     ED Disposition Condition    Discharge Stable        ED Prescriptions     Medication Sig Dispense Start Date End Date Auth. Provider    prenatal 21-iron fu-folic acid (PRENATAL COMPLETE) 14 mg iron- 400 mcg Tab Take 1 tablet by mouth once daily. 30 tablet 2020 Patria Gaxiola, CHARAN    metroNIDAZOLE (FLAGYL) 500 MG tablet Take 1 tablet (500 mg total) by  mouth 2 (two) times a day. for 7 days 14 tablet 12/16/2020 12/23/2020 Patria Gaxiola NP        Follow-up Information     Follow up With Specialties Details Why Contact Info    Lore Rodriguez NP Family Medicine Schedule an appointment as soon as possible for a visit  For follow-up [address] [facility] [address]  [phone]      [facility] Obstetrics and Gynecology Schedule an appointment as soon as possible for a visit   [address] [facility] [place] [address] [phone]      [facility] Emergency Medicine Go to  If symptoms worsen [address] [facility] [address] [phone]                                       Patria Gaxiola NP  12/16/20 1924

## 2020-12-16 NOTE — FIRST PROVIDER EVALUATION
Emergency Department TeleTriage Encounter Note      CHIEF COMPLAINT    Chief Complaint   Patient presents with    Abdominal Cramping     started this am    Vaginal Bleeding     pregnant not sure how far alone ,started today        VITAL SIGNS   Initial Vitals [20 1623]   BP Pulse Resp Temp SpO2   (!) 140/62 101 18 98.7 °F (37.1 °C) 100 %      MAP       --            ALLERGIES    Review of patient's allergies indicates:  No Known Allergies    PROVIDER TRIAGE NOTE    HPI: Aziza Carter, a 25 y.o. female presents to the ED vaginal bleeding and abdominal cramping in early pregnancy that started today.  States she is not actively bleeding now.  Attests to 1 healthy pregnancy and 1 elected  prior.  LMP around 11/10/2020.          ORDERS  Labs Reviewed - No data to display    ED Orders (720h ago, onward)    None            Virtual Visit Note: The provider triage portion of this emergency department evaluation and documentation was performed via [employer], a HIPAA-compliant telemedicine application, in concert with a tele-presenter in the room. A face to face patient evaluation with one of my colleagues will occur once the patient is placed in an emergency department room.      DISCLAIMER: This note was prepared with [employer] voice recognition transcription software. Garbled syntax, mangled pronouns, and other bizarre constructions may be attributed to that software system.

## 2020-12-17 LAB
C TRACH DNA SPEC QL NAA+PROBE: NOT DETECTED
N GONORRHOEA DNA SPEC QL NAA+PROBE: NOT DETECTED

## 2020-12-17 NOTE — DISCHARGE INSTRUCTIONS
You may take Tylenol for pain.  Please follow-up with your OBGYN for re-evaluation of symptoms.      Please return to the Emergency Department for any new or worsening symptoms including:  Abdominal pain, fever, chest pain, shortness of breath, loss of consciousness, dizziness, weakness, or any other concerns.     Please follow up with your Primary Care Provider within in the week. If you do not have one, you may contact the one listed on your discharge paperwork or you may also call the [facility] Appointment Desk at [phone] to schedule an appointment with one.     Please take all medication as prescribed.

## 2021-02-13 ENCOUNTER — HOSPITAL ENCOUNTER (EMERGENCY)
Facility: HOSPITAL | Age: 26
Discharge: HOME | End: 2021-02-13
Attending: EMERGENCY MEDICINE
Payer: MEDICAID

## 2021-02-13 VITALS
SYSTOLIC BLOOD PRESSURE: 142 MMHG | RESPIRATION RATE: 18 BRPM | HEART RATE: 85 BPM | HEIGHT: 65 IN | WEIGHT: 214 LBS | DIASTOLIC BLOOD PRESSURE: 64 MMHG | OXYGEN SATURATION: 100 % | TEMPERATURE: 99 F | BODY MASS INDEX: 35.65 KG/M2

## 2021-02-13 DIAGNOSIS — O46.92 VAGINAL BLEEDING IN PREGNANCY, SECOND TRIMESTER: Primary | ICD-10-CM

## 2021-02-13 DIAGNOSIS — O46.90 VAGINAL BLEEDING IN PREGNANCY: ICD-10-CM

## 2021-02-13 LAB
B-HCG UR QL: POSITIVE
BACTERIA #/AREA URNS HPF: ABNORMAL /HPF
BACTERIA GENITAL QL WET PREP: ABNORMAL
BILIRUB UR QL STRIP: NEGATIVE
CLARITY UR: CLEAR
CLUE CELLS VAG QL WET PREP: ABNORMAL
COLOR UR: COLORLESS
CTP QC/QA: YES
FILAMENT FUNGI VAG WET PREP-#/AREA: ABNORMAL
GLUCOSE UR QL STRIP: NEGATIVE
HCG INTACT+B SERPL-ACNC: NORMAL MIU/ML
HGB UR QL STRIP: ABNORMAL
KETONES UR QL STRIP: NEGATIVE
LEUKOCYTE ESTERASE UR QL STRIP: ABNORMAL
MICROSCOPIC COMMENT: ABNORMAL
NITRITE UR QL STRIP: NEGATIVE
PH UR STRIP: 7 [PH] (ref 5–8)
PROT UR QL STRIP: ABNORMAL
RBC #/AREA URNS HPF: 28 /HPF (ref 0–4)
SP GR UR STRIP: 1 (ref 1–1.03)
SPECIMEN SOURCE: ABNORMAL
SQUAMOUS #/AREA URNS HPF: 2 /HPF
T VAGINALIS GENITAL QL WET PREP: ABNORMAL
UNIDENT CRYS URNS QL MICRO: ABNORMAL
URN SPEC COLLECT METH UR: ABNORMAL
UROBILINOGEN UR STRIP-ACNC: NEGATIVE EU/DL
WBC #/AREA URNS HPF: 8 /HPF (ref 0–5)
WBC #/AREA VAG WET PREP: ABNORMAL
WBC CLUMPS URNS QL MICRO: ABNORMAL
YEAST GENITAL QL WET PREP: ABNORMAL

## 2021-02-13 PROCEDURE — 81000 URINALYSIS NONAUTO W/SCOPE: CPT

## 2021-02-13 PROCEDURE — 81025 URINE PREGNANCY TEST: CPT | Performed by: PHYSICIAN ASSISTANT

## 2021-02-13 PROCEDURE — 87088 URINE BACTERIA CULTURE: CPT

## 2021-02-13 PROCEDURE — 87147 CULTURE TYPE IMMUNOLOGIC: CPT

## 2021-02-13 PROCEDURE — 84702 CHORIONIC GONADOTROPIN TEST: CPT

## 2021-02-13 PROCEDURE — 87210 SMEAR WET MOUNT SALINE/INK: CPT

## 2021-02-13 PROCEDURE — 99284 EMERGENCY DEPT VISIT MOD MDM: CPT | Mod: 25

## 2021-02-13 PROCEDURE — 87591 N.GONORRHOEAE DNA AMP PROB: CPT

## 2021-02-13 PROCEDURE — 87491 CHLMYD TRACH DNA AMP PROBE: CPT

## 2021-02-13 PROCEDURE — 87086 URINE CULTURE/COLONY COUNT: CPT

## 2021-02-15 LAB
BACTERIA UR CULT: ABNORMAL
C TRACH DNA SPEC QL NAA+PROBE: NOT DETECTED
N GONORRHOEA DNA SPEC QL NAA+PROBE: NOT DETECTED

## 2021-08-23 ENCOUNTER — LAB VISIT (OUTPATIENT)
Dept: PRIMARY CARE CLINIC | Facility: OTHER | Age: 26
End: 2021-08-23
Attending: INTERNAL MEDICINE
Payer: MEDICAID

## 2021-08-23 DIAGNOSIS — Z20.822 ENCOUNTER FOR LABORATORY TESTING FOR COVID-19 VIRUS: ICD-10-CM

## 2021-08-23 PROCEDURE — U0003 INFECTIOUS AGENT DETECTION BY NUCLEIC ACID (DNA OR RNA); SEVERE ACUTE RESPIRATORY SYNDROME CORONAVIRUS 2 (SARS-COV-2) (CORONAVIRUS DISEASE [COVID-19]), AMPLIFIED PROBE TECHNIQUE, MAKING USE OF HIGH THROUGHPUT TECHNOLOGIES AS DESCRIBED BY CMS-2020-01-R: HCPCS | Performed by: INTERNAL MEDICINE

## 2021-08-25 LAB
SARS-COV-2 RNA RESP QL NAA+PROBE: NOT DETECTED
SARS-COV-2- CYCLE NUMBER: NORMAL

## 2023-01-24 ENCOUNTER — HOSPITAL ENCOUNTER (EMERGENCY)
Facility: HOSPITAL | Age: 28
Discharge: HOME | End: 2023-01-24
Attending: EMERGENCY MEDICINE
Payer: MEDICAID

## 2023-01-24 VITALS
SYSTOLIC BLOOD PRESSURE: 136 MMHG | DIASTOLIC BLOOD PRESSURE: 80 MMHG | HEIGHT: 64 IN | TEMPERATURE: 99 F | RESPIRATION RATE: 17 BRPM | BODY MASS INDEX: 41.83 KG/M2 | OXYGEN SATURATION: 100 % | HEART RATE: 91 BPM | WEIGHT: 245 LBS

## 2023-01-24 DIAGNOSIS — R11.2 NAUSEA AND VOMITING, UNSPECIFIED VOMITING TYPE: ICD-10-CM

## 2023-01-24 DIAGNOSIS — B34.9 VIRAL SYNDROME: Primary | ICD-10-CM

## 2023-01-24 LAB
B-HCG UR QL: NEGATIVE
CTP QC/QA: YES
POC MOLECULAR INFLUENZA A AGN: NEGATIVE
POC MOLECULAR INFLUENZA B AGN: NEGATIVE
SARS-COV-2 RDRP RESP QL NAA+PROBE: NEGATIVE

## 2023-01-24 PROCEDURE — 25000003 PHARM REV CODE 250: Performed by: PHYSICIAN ASSISTANT

## 2023-01-24 PROCEDURE — 87635 SARS-COV-2 COVID-19 AMP PRB: CPT | Performed by: PHYSICIAN ASSISTANT

## 2023-01-24 PROCEDURE — 81025 URINE PREGNANCY TEST: CPT | Performed by: PHYSICIAN ASSISTANT

## 2023-01-24 PROCEDURE — 99283 EMERGENCY DEPT VISIT LOW MDM: CPT

## 2023-01-24 RX ORDER — FAMOTIDINE 20 MG/1
20 TABLET, FILM COATED ORAL
Status: COMPLETED | OUTPATIENT
Start: 2023-01-24 | End: 2023-01-24

## 2023-01-24 RX ORDER — ONDANSETRON 4 MG/1
4 TABLET, ORALLY DISINTEGRATING ORAL EVERY 6 HOURS PRN
Qty: 15 TABLET | Refills: 0 | Status: SHIPPED | OUTPATIENT
Start: 2023-01-24

## 2023-01-24 RX ORDER — DICYCLOMINE HYDROCHLORIDE 10 MG/1
20 CAPSULE ORAL
Status: COMPLETED | OUTPATIENT
Start: 2023-01-24 | End: 2023-01-24

## 2023-01-24 RX ORDER — DICYCLOMINE HYDROCHLORIDE 20 MG/1
20 TABLET ORAL 2 TIMES DAILY
Qty: 20 TABLET | Refills: 0 | Status: SHIPPED | OUTPATIENT
Start: 2023-01-24 | End: 2023-02-23

## 2023-01-24 RX ORDER — ONDANSETRON 4 MG/1
4 TABLET, ORALLY DISINTEGRATING ORAL
Status: COMPLETED | OUTPATIENT
Start: 2023-01-24 | End: 2023-01-24

## 2023-01-24 RX ADMIN — ONDANSETRON 4 MG: 4 TABLET, ORALLY DISINTEGRATING ORAL at 05:01

## 2023-01-24 RX ADMIN — DICYCLOMINE HYDROCHLORIDE 20 MG: 10 CAPSULE ORAL at 05:01

## 2023-01-24 RX ADMIN — FAMOTIDINE 20 MG: 20 TABLET, FILM COATED ORAL at 05:01

## 2023-01-24 NOTE — DISCHARGE INSTRUCTIONS
Rest.  Clear liquid diet and advance as you tolerate.  Return to the emergency department for any change or concerning symptoms.  Stay away from other people when you are feeling badly.

## 2023-01-24 NOTE — Clinical Note
"Aziza"James Carter was seen and treated in our emergency department on 1/24/2023.  She may return to work on 01/26/2023.       If you have any questions or concerns, please don't hesitate to call.      Gale Staton PA-C"

## 2023-01-24 NOTE — ED PROVIDER NOTES
Encounter Date: 2023       History     Chief Complaint   Patient presents with    Vomiting     26 yo female to triage for abd pain, N/V/D x 2-3 days.Pt says her kids were sick w/ the same symptoms. Also feels weak now due to vomiting. AAox4    Diarrhea    Abdominal Pain     This is a 27-year-old female with the past medical history of induced  who presents to the emergency department complaining of vomiting and diarrhea and epigastric abdominal pain that started on Saturday.  She reports epigastric pain is intermittent, 7/10 and rated as a soreness from vomiting.  She reports intermittent headaches but denies fever, chills or upper respiratory symptoms.  She was able to eat this morning and hold down breakfast.  She is not vomited yet today.  Of note, her 2 small children are sick with similar symptoms.  She is not attempted to take any medications to alleviate her symptoms.  There are no other alleviating or exacerbating factors.  No other associated symptoms.    Review of patient's allergies indicates:  No Known Allergies  History reviewed. No pertinent past medical history.  Past Surgical History:   Procedure Laterality Date    INDUCED   2019     Family History   Problem Relation Age of Onset    Hypertension Maternal Grandfather     No Known Problems Mother     Hypertension Father      Social History     Tobacco Use    Smoking status: Never    Smokeless tobacco: Never   Substance Use Topics    Alcohol use: Yes     Alcohol/week: 0.0 standard drinks     Comment: occasionally    Drug use: Yes     Types: Marijuana     Review of Systems   Constitutional:  Negative for chills, fatigue and fever.   HENT:  Negative for ear pain, rhinorrhea and sneezing.    Eyes:  Negative for pain.   Respiratory:  Negative for shortness of breath.    Cardiovascular:  Negative for chest pain.   Gastrointestinal:  Positive for abdominal pain, diarrhea, nausea and vomiting.   Genitourinary:  Negative for dysuria.    Musculoskeletal:  Negative for back pain.   Skin:  Negative for rash.   All other systems reviewed and are negative.    Physical Exam     Initial Vitals [01/24/23 1501]   BP Pulse Resp Temp SpO2   (!) 145/98 99 17 98.4 °F (36.9 °C) 99 %      MAP       --         Physical Exam    Nursing note and vitals reviewed.  Constitutional: She appears well-developed and well-nourished. She is not diaphoretic. No distress.   HENT:   Head: Normocephalic and atraumatic.   Nose: Nose normal.   Mouth/Throat: Oropharynx is clear and moist.   Eyes: EOM are normal. Pupils are equal, round, and reactive to light.   Neck: Neck supple.   Normal range of motion.  Cardiovascular:  Normal rate and regular rhythm.           No murmur heard.  Pulmonary/Chest: Breath sounds normal. No respiratory distress. She has no wheezes. She has no rhonchi. She has no rales.   No CVA tenderness to palpation.   Abdominal: Abdomen is soft. Bowel sounds are normal. She exhibits no distension. There is no abdominal tenderness. There is no rebound and no guarding.   Musculoskeletal:         General: No tenderness or edema. Normal range of motion.      Cervical back: Normal range of motion and neck supple.     Neurological: She is alert and oriented to person, place, and time. No cranial nerve deficit.   Skin: Skin is warm. No rash noted. No erythema.       ED Course   Procedures  Labs Reviewed   POCT URINE PREGNANCY   POCT INFLUENZA A/B MOLECULAR   SARS-COV-2 RDRP GENE          Imaging Results    None          Medications   ondansetron disintegrating tablet 4 mg (4 mg Oral Given 1/24/23 1707)   famotidine tablet 20 mg (20 mg Oral Given 1/24/23 1707)   dicyclomine capsule 20 mg (20 mg Oral Given 1/24/23 1707)           APC / Resident Notes:   This is an urgent evaluation of a 27-year-old female presents to the emergency department complaining of nausea, vomiting, and diarrhea.  Her 2 daughters are sick with similar symptoms.      The patient is currently  afebrile and nontoxic in appearance.  Vital signs are stable.  On physical exam, there is no abdominal tenderness, rebound or guarding noted.  The remaining physical exam is unremarkable.  No CVA tenderness appreciated.  While in the emergency department urine pregnancy test was performed and was negative.  Rapid influenza and COVID test were performed were negative.  She was given Pepcid, Bentyl and Zofran in the emergency department and reported feeling better.  She would like to go home.  I will treat her for viral syndrome with supportive therapies.  Return precautions reviewed.  She is currently safe and stable for discharge at this time.                   Clinical Impression:   Final diagnoses:  [B34.9] Viral syndrome (Primary)  [R11.2] Nausea and vomiting, unspecified vomiting type        ED Disposition Condition    Discharge Stable          ED Prescriptions       Medication Sig Dispense Start Date End Date Auth. Provider    ondansetron (ZOFRAN-ODT) 4 MG TbDL Take 1 tablet (4 mg total) by mouth every 6 (six) hours as needed (nausea). 15 tablet 1/24/2023 -- Gale Staton PA-C    dicyclomine (BENTYL) 20 mg tablet Take 1 tablet (20 mg total) by mouth 2 (two) times daily. 20 tablet 1/24/2023 2/23/2023 Gale Staton PA-C          Follow-up Information       Follow up With Specialties Details Why Contact Info    Lore Rodriguez NP Family Medicine   [address] [place] [address]  [place] [address]  [phone]               Gale Staton PA-C  01/24/23 8159